# Patient Record
Sex: FEMALE | Race: WHITE
[De-identification: names, ages, dates, MRNs, and addresses within clinical notes are randomized per-mention and may not be internally consistent; named-entity substitution may affect disease eponyms.]

---

## 2017-02-08 NOTE — EDPHY
H & P


Stated Complaint: left pelvic pain today, reports left ovarian cyst





- Personal History


LMP (Females 10-55): Hysterectomy


Current Tetanus/Diphtheria Vaccine: Yes


Current Tetanus Diphtheria and Acellular Pertussis (TDAP): Yes


Tetanus Vaccine Date: 2010





- Medical/Surgical History


Hx Asthma: No


Hx Chronic Respiratory Disease: No


Hx Diabetes: No


Hx Cardiac Disease: No


Hx Renal Disease: No


Hx Cirrhosis: No


Hx Alcoholism: No


Hx HIV/AIDS: No


Hx Splenectomy or Spleen Trauma: No


Other PMH: left ovarian cyst, hysterectomy-prolapse.





- Social History


Smoking Status: Never smoked


Time Seen by Provider: 02/08/17 22:57


HPI/ROS: 


CHIEF COMPLAINT:  Left lower quadrant abdominal pain





HISTORY OF PRESENT ILLNESS:  41-year-old female history of partial hysterectomy

, multiple ovarian cyst, complaining of left lower quadrant abdominal pain/

cramping since earlier today.  Describes feeling similar to her prior ovarian 

cysts.  Non thunderclap pain.  No radiation.  No antecedent symptoms. No nausea 

or vomiting. No fever no chills. Bowel movements normal, no melena or 

hematochezia.  No constipation.  No diarrhea.  Normal urinary habits.








REVIEW OF SYSTEMS:


A ten point review of systems was performed and is negative with the exception 

of the items mentioned in the HPI








PAST MEDICAL & SURGICAL  HISTORY:  Partial hysterectomy.  Ovarian cyst





SOCIAL HISTORY:nonsmoker     











************


PHYSICAL EXAM





(Prior to examination, patient consented to physical exam, hands were washed 

and my usual and customary physical exam procedures followed)


1) GENERAL: Well-developed, well-nourished, alert and oriented.  Appears 

nontoxic .


2) HEAD: Normocephalic, atraumatic


3) HEENT: Pupils equal, round, reactive to light bilaterally.  Sclera 

anicteric.  


4) NECK: Full range of motion, no meningeal signs.


5) LUNGS: Clear auscultation bilaterally, no wheezes, no rhonchi, no 

retractions.   


6) HEART: Regular rate and rhythm, no murmur, no heave, no gallop.


7) ABDOMEN: No guarding, tender to palpation left lower quadrant, negative 

McBurney's, negative Mauro's,, negative peritoneal sign,


8) MUSCULOSKELETAL: Moving all extremities, no focal areas of tenderness, no 

obvious trauma.  No peripheral edema or discoloration.


9) BACK: No CVA tenderness, no midline vertebral tenderness, no fluctuance, no 

step-off, no obvious trauma, no visual or palpable abnormality. 


10) SKIN: No rash, no petechiae. 


11) Psychiatric:  Patient is oriented X 3, there is no agitation.








***************





DIFFERENTIAL DIAGNOSIS:   My differential diagnosis includes, but is not 

limited to, acute appendicitis, acute cholecystitis, bowel obstruction, 

Diverticulitis,acute pancreatitis, ovarian torsion, ectopic pregnancy, 

gastritis and urinary tract infection.  The patient understands that this 

diagnosis is provisional and can never be 100% accurate.  This is a partial 

list of diagnoses considered. These considerations are based on history, 

physical exam, past history and reassessment.


 (JESUS MANUEL Rodrigez)





Constitutional: 


 Initial Vital Signs











Temperature (C)  36.6 C   02/08/17 22:24


 


Heart Rate  95   02/08/17 22:24


 


Respiratory Rate  15   02/08/17 22:24


 


Blood Pressure  106/64   02/08/17 22:24


 


O2 Sat (%)  95   02/08/17 22:24








 











O2 Delivery Mode               Room Air

















Allergies/Adverse Reactions: 


 





No Known Allergies Allergy (Verified 11/15/16 15:23)


 








Home Medications: 














 Medication  Instructions  Recorded


 


Magnesium Citrate [Magnesium 300 ml PO ONCE #1 bottle 02/09/17





Citrate 300 ml (*)]  


 


Polyethylene Glycol 3350 [Miralax 17 gm PO DAILY #10 pkt 02/09/17





17 gm (*)]  














Medical Decision Making





- Diagnostics


Imaging: 


CT scan abdomen pelvis performed with IV contrast demonstrates is large amount 

of stool in colon extending to near the cecum.  This was discussed with Dr. Whitt of Radiology. (Reyna Venegas)





ED Course/Re-evaluation: 


1230 am: Care turned over to Dr Venegas at this time who will followup on 

patient's CT abdomen.  (JESUS MANUEL Rodrigez)


PHYSICIAN DOCUMENTATION:


The patient was evaluated and managed by the Physician Assistant.  My co-

signature indicates that I have reviewed this chart and I agree with the 

findings and plan of care as documented.  I am the secondary supervising 

physician.








1:00 a.m.-


I have assumed primary care of the patient.  CT scan has resulted showing 

constipation.  I have discussed this with the patient at the bedside.  I plan 

to start her on MiraLax and magnesium citrate.  She is to follow up with her 

regular doctor or Gastroenterology if her pain continues. (Reyna Venegas)








- Data Points


Laboratory Results: 


 Laboratory Results





 02/08/17 22:50 





 02/08/17 22:50 





 











  02/08/17 02/08/17





  23:58 22:50


 


WBC    6.87 10^3/uL





    (3.80-9.50) 


 


RBC    4.50 10^6/uL





    (4.18-5.33) 


 


Hgb    14.2 g/dL





    (12.6-16.3) 


 


Hct    42.0 %





    (38.0-47.0) 


 


MCV    93.3 fL





    (81.5-99.8) 


 


MCH    31.6 pg





    (27.9-34.1) 


 


MCHC    33.8 g/dL





    (32.4-36.7) 


 


RDW    12.5 %





    (11.5-15.2) 


 


Plt Count    312 10^3/uL





    (150-400) 


 


MPV    9.1 fL





    (8.7-11.7) 


 


Neut % (Auto)    51.7 %





    (39.3-74.2) 


 


Lymph % (Auto)    38.7 %





    (15.0-45.0) 


 


Mono % (Auto)    7.1 %





    (4.5-13.0) 


 


Eos % (Auto)    2.0 %





    (0.6-7.6) 


 


Baso % (Auto)    0.4 %





    (0.3-1.7) 


 


Nucleat RBC Rel Count    0.0 %





    (0.0-0.2) 


 


Absolute Neuts (auto)    3.54 10^3/uL





    (1.70-6.50) 


 


Absolute Lymphs (auto)    2.66 10^3/uL





    (1.00-3.00) 


 


Absolute Monos (auto)    0.49 10^3/uL





    (0.30-0.80) 


 


Absolute Eos (auto)    0.14 10^3/uL





    (0.03-0.40) 


 


Absolute Basos (auto)    0.03 10^3/uL





    (0.02-0.10) 


 


Absolute Nucleated RBC    0.00 10^3/uL





    (0-0.01) 


 


Immature Gran %    0.1 %





    (0.0-1.1) 


 


Immature Gran #    0.01 10^3/uL





    (0.00-0.10) 


 


Sodium    138 mEq/L





    (134-144) 


 


Potassium    3.8 mEq/L





    (3.5-5.2) 


 


Chloride    104 mEq/L





    () 


 


Carbon Dioxide    24 mEq/l





    (22-31) 


 


Anion Gap    10 mEq/L





    (8-16) 


 


BUN    8 mg/dL





    (7-23) 


 


Creatinine    0.7 mg/dL





    (0.6-1.0) 


 


Estimated GFR    > 60 





   


 


Glucose    99 mg/dL





    () 


 


Calcium    9.6 mg/dL





    (8.5-10.4) 


 


Total Bilirubin    0.7 mg/dL





    (0.1-1.4) 


 


Conjugated Bilirubin    0.4 mg/dL





    (0.0-0.5) 


 


Unconjugated Bilirubin    0.3 mg/dL





    (0.0-1.1) 


 


AST    28 IU/L





    (14-46) 


 


ALT    27 IU/L





    (9-52) 


 


Alkaline Phosphatase    47 IU/L





    () 


 


Total Protein    8.7 H g/dL





    (6.3-8.2) 


 


Albumin    4.9 g/dL





    (3.5-5.0) 


 


Lipase    99.0 IU/L





    () 


 


Beta HCG, Qual    NEGATIVE 





   


 


Urine Color  YELLOW   





   


 


Urine Appearance  CLEAR   





   


 


Urine pH  6.0   





   (5.0-7.5)  


 


Ur Specific Gravity  1.010   





   (1.002-1.030)  


 


Urine Protein  NEGATIVE   





   (NEGATIVE)  


 


Urine Ketones  NEGATIVE   





   (NEGATIVE)  


 


Urine Blood  NEGATIVE   





   (NEGATIVE)  


 


Urine Nitrate  NEGATIVE   





   (NEGATIVE)  


 


Urine Bilirubin  NEGATIVE   





   (NEGATIVE)  


 


Urine Urobilinogen  NEGATIVE EU  





   (0.2-1.0)  


 


Ur Leukocyte Esterase  NEGATIVE   





   (NEGATIVE)  


 


Urine RBC  1-3 /hpf  





   (0-3)  


 


Urine WBC  1-3 /hpf  





   (0-3)  


 


Ur Epithelial Cells  TRACE /lpf  





   (NONE-1+)  


 


Urine Bacteria  TRACE H /hpf  





   (NONE SEEN)  


 


Urine Mucus  TRACE /lpf  





   (NONE-1+)  


 


Urine Glucose  NEGATIVE   





   (NEGATIVE)  














Medications Given: 


 








Discontinued Medications





Hydromorphone HCl (Dilaudid)  1 mg IVP EDNOW ONE


   Stop: 02/08/17 23:22


   Last Admin: 02/08/17 23:43 Dose:  1 mg


Sodium Chloride (Ns)  1,000 mls @ 0 mls/hr IV EDNOW ONE


   PRN Reason: Wide Open


   Stop: 02/08/17 23:43


   Last Admin: 02/08/17 23:43 Dose:  1,000 mls


Ondansetron HCl (Zofran)  4 mg IVP EDNOW ONE


   Stop: 02/08/17 23:22


   Last Admin: 02/08/17 23:44 Dose:  4 mg


Ondansetron HCl (Zofran)  4 mg IVP EDNOW ONE


   Stop: 02/09/17 00:39


   Last Admin: 02/09/17 00:47 Dose:  4 mg











Departure





- Departure


Disposition: Home, Routine, Self-Care


Clinical Impression: 


 Abdominal pain, Constipation


Condition: Good


Instructions:  Acute Abdominal Pain (ED), High Fiber Diet (ED), Constipation (ED

)


Additional Instructions: 


Please make sure to drink plenty of fluids.  You should follow up with your 

doctor if you are not better in the next few days.


Referrals: 


Russell Christensen MD [Medical Doctor] - 2-3 days, call for appt.


Prescriptions: 


Magnesium Citrate [Magnesium Citrate 300 ml (*)] 300 ml PO ONCE #1 bottle


Polyethylene Glycol 3350 [Miralax 17 gm (*)] 17 gm PO DAILY #10 pkt

## 2017-02-08 NOTE — US
Pelvic sonogram, complete.



History: Pelvic pain.



Technique: Transabdominal and endovaginal. Pulsed and color flow Doppler investigation of the ovaries
 was performed.



Findings:



Transabdominal: Pelvic structures are poorly visualized.



Endovaginal examination: There are benign-appearing follicle cysts present within both ovaries. Paten
t arterial blood flow is documented to both ovaries on Doppler investigation. The uterus is surgicall
y absent. No peritoneal free fluid.



Impression:

1. Benign pelvic ultrasound examination. Small bilateral follicle cyst. Hysterectomy.



Results called to Maury Rodrigez PA-C at 11:50 PM.

## 2017-02-09 NOTE — CT
Contrast-Enhanced CT Scan of the Abdomen and Pelvis



Clinical History:  41-year-old female presenting to the ED complaining of left 
lower quadrant abdominal pain; rule out diverticulitis.



Technique:  Neither oral nor retrograde rectal contrast was administered.  The 
patient received 85 mL IV Isovue-300 without complication, and a multidetector 
helical CT scan was obtained from the lung bases inferiorly through the 
proximal femora, with images reformatted at 5.00 and 1.50 mm increments, and 
reviewed at a variety of window and level settings.  Parasagittal and 
paracoronal reconstructed images were reviewed on the workstation.  The DFOV is 
38.0 cm.  A dose reduction protocol was used.



Comparison Studies:  Pelvic sonography dated February 8, 2017, at 11:15 p.m., 
and CT imaging of the abdomen and pelvis, dated November 15, 2016.



Findings:



Contrast-Enhanced CT Scan of the Abdomen:  The lung bases are clear.  The 
visualized cardiac chambers and pericardium are unremarkable. There is no 
pleural effusion.  The liver has an elongate right hepatic lobe measuring 18.5 
cm, which could represent mild hepatomegaly versus a Reidel variant.  There is 
no bile duct dilatation.  The gallbladder is moderately distended. The 
pancreatic contour is normal.  The spleen, adrenal glands, and the kidneys are 
normal. There is no ascites or pneumoperitoneum.  There is gastric distention 
with intraluminal fluid. There is no abnormal small bowel dilatation. There is 
profound constipation with fecal material noted from the level of the cecum to 
the rectosigmoid. There is no inflammatory diverticulitis or ascites.  The 
abdominal aorta and the IVC are normal in caliber.  The osseous structures are 
age-appropriate. 



Contrast-Enhanced CT Scan of the Pelvis:  The uterus is surgically absent.  The 
urinary bladder is not distended.  There is no adnexal mass, and pelvic 
sonography demonstrated normal-appearing ovaries. There is pronounced 
constipation.  There is no evidence of diverticulitis or pericolonic abscess, 
free fluid, or adenopathy.  The osseous structures are age-appropriate. The 
previous study on November 15, 2016 demonstrated some questionable wall 
thickening of the distal ileum, which is not seen today. The appendix is 
identified on series 4, images 195-204, and is normal. The presacral space is 
normal.



Preliminary results were conveyed to Dr. Reyna Venegas at 12:55 a.m. on 
February 9, 2017.  My final interpretation is concordant with my initial 
impression.



Impression:



1.  Pronounced constipation.

2.  There is no evidence of diverticulitis.

3.  Status post hysterectomy.

4.  Normal appearance of the appendix. 

5.  Hepatomegaly vs Reidel right hepatic lobe. 



POS99
MTDD

## 2017-02-22 NOTE — EDPHY
H & P


Stated Complaint: Abd bloating,constipation,abd pain;here 2/8 with same c/o;CT 

done then


Time Seen by Provider: 02/22/17 13:17


HPI/ROS: 





CHIEF COMPLAINT: Abdominal pain and distention. 





HISTORY OF PRESENT ILLNESS: The patient is a 41 year old female, sent here by 

her PCP for abdominal distention. The patient had a hysterectomy 1 year ago. 

Since the surgery she has continuously had trouble with constipation. Her 

constipation has worsened and she now feels more distended specifically on the 

left side of her abdomen. She was seen at [facility] and was advised to 

perform a bowel purge. Her symptoms did not improve after the purge and x-ray 

showed constipation. Patient was instructed not to use stimulant laxative due 

to dilated bowel.  She saw her PCP today who recommended the patient come to 

the ED due to worsening abdominal discomfort and unimproved abdominal distention

, She denies dysuria or hematuria. 


No fevers, vomiting, diarrhea.





REVIEW OF SYSTEMS:


Aside from elements discussed in the HPI, a comprehensive 10-point review of 

systems was reviewed and is negative.





PAST MEDICAL HISTORY: Hysterectomy, Chronic constipation, Laxative dependent. 





SOCIAL HISTORY:





VITAL SIGNS:  Reviewed by me


GENERAL:  Well-developed, well-nourished, resting comfortably in no respiratory 

distress.


HEENT:  Atraumatic.  Eyes:  No icterus, no injection.  Mouth:  moist mucous 

membranes.  No erythema or lesions.  Neck:  supple with no adenopathy.


LUNGS:  Clear to auscultation bilaterally, no wheezes, rhonchi or rales.


CARDIAC:  Regular rate and rhythm, no rubs, murmurs or gallops.


ABDOMEN:  Soft, visibily distended on the left greater than right.  Moderate 

left sided tenderness; no rebound or guarding.


BACK:  No CVA tenderness.


EXTREMITIES:  No trauma.  No edema.  Range of motion is normal throughout.


NEURO:  Alert and oriented,  grossly nonfocal.


SKIN:  Warm and dry, no rash.


PSYCHIATRIC:  Normal mentation, no agitation.





Portions of this note were transcribed by a medical scribe.  I personally 

performed a history, physical exam, medical decision making, and confirmed 

accuracy of information the transcribed note.





- Personal History


LMP (Females 10-55): Hysterectomy


Current Tetanus Diphtheria and Acellular Pertussis (TDAP): Yes


Tetanus Vaccine Date: 2010





- Medical/Surgical History


Hx Asthma: No


Hx Chronic Respiratory Disease: No


Hx Diabetes: No


Hx Cardiac Disease: No


Hx Renal Disease: No


Hx Cirrhosis: No


Hx Alcoholism: No


Hx HIV/AIDS: No


Hx Splenectomy or Spleen Trauma: No


Other PMH: left ovarian cyst, hysterectomy-prolapse.  laxative dependent, 

chronic constipation





- Social History


Smoking Status: Never smoked


Constitutional: 


 Initial Vital Signs











Temperature (C)  36.7 C   02/22/17 13:14


 


Heart Rate  93   02/22/17 13:14


 


Respiratory Rate  18   02/22/17 13:14


 


Blood Pressure  159/108 H  02/22/17 13:14


 


O2 Sat (%)  99   02/22/17 13:14








 











O2 Delivery Mode               Room Air














Allergies/Adverse Reactions: 


 





No Known Allergies Allergy (Verified 02/22/17 13:13)


 








Home Medications: 














 Medication  Instructions  Recorded


 


Docusate Sodium [Colace 100 MG (*)] 100 mg PO BID 02/22/17


 


Peg 3350/Na Sulf,Bicarb,Cl/KCl 4,000 ml PO AD #1 btl 02/22/17





[Golytely (RX)]  


 


Polyethylene Glycol 3350 [Miralax  02/22/17





17 gm (*)]  














Medical Decision Making





- Diagnostics


Imaging: 





X-ray:  Two-view abdomen was obtained.  I viewed the images myself on the PACS 

system.  My interpretation of the images is:  Dilated large bowel full of 

stool.  No air-fluid levels.  The radiologist interpretation is pending at this 

time.  I discussed the x-ray findings with the patient.


ED Course/Re-evaluation: 





The patient has chronic constipation and relies on laxatives. An abdominal x-

ray was ordered. I will consult her gastroenterologist. 





Patient's x-ray continues to show significant constipation despite the use of 

magnesium citrate and bowel purges.


Discussed patient at length with the PA from [facility], Summer Monet.  

She is concerned regarding potential neurologic causes of constipation.


Patient to perform a bowel purge with GoLYTELY and will obtain a prescription 

from [facility] the [facility].


Reinforced the need for patient to keep appointment for ano-rectal manometry 

testing and to FU with GI.


Appointment made by . 





Differential Diagnosis: 





After obtaining the patient's history and performing an examination, 

differential diagnosis considered included but was not limited to constipation, 

bowel obstruction, toxic megacolon, functional ileus, laxative abuse, 

neurologic causes.





Departure





- Departure


Disposition: Home, Routine, Self-Care


Clinical Impression: 


 Abdominal pain, left lower quadrant, Chronic constipation





Condition: Good


Instructions:  Constipation (ED), High Fiber Diet (ED)


Additional Instructions: 


Do not take further stimulant laxatives.





Please take the GoLYTELY as directed.


Please discuss the appropriate dosing of GoLYTELY with GI of the [facility].





A sample medications will be waiting for you at  [facility].  Please 

pick this up today.





As mentioned previously, follow up with your surgeon as well as follow-up for 

anorectal manometry will be important.





We have made you an appointment with Asia Monet next Wednesday, Febraury 28, 

at 330 in the afternoon at their [place] location. The address is [address] [place] [address]





Referrals: 


NONE *PRIMARY CARE P,. [Primary Care Provider] - As per Instructions


Summer Monet, PAC [Physician Assistant] - As per Instructions


Prescriptions: 


Peg 3350/Na Sulf,Bicarb,Cl/KCl [Golytely (RX)] 4,000 ml PO AD #1 btl


Report Scribed for: Laly Blankenship


Report Scribed by: Alexandra Gundersen


Date of Report: 02/22/17


Time of Report: 14:09

## 2017-04-03 NOTE — EDPHY
General





- History


Smoking Status: Never smoked


Narrative: 





CHIEF COMPLAINT:  Abdominal pain, distention





HISTORY OF PRESENT ILLNESS:  Patient has had 4-6 months of abdominal pain and 

distention. This was gradual onset.  It is constant in duration but waxes and 

wanes from mild to severe.  It is currently rated as severe distension.  Worse 

after she eats.  Sometimes improves when NPO, at other times does not.  

Associated with nausea but no vomiting. No fevers or chills. No bloody stools 

or emesis.  No diarrhea.  It is associated with intermittent constipation.  She 

is status post hysterectomy in 2015.  She has been worked up by Internal 

Medicine, GI and Gynecology.  Reportedly had ovarian cysts that were to be due 

to this, but after those resolved her symptoms persist.  Gastroenterologist 

feel that there might be a colonic transit abnormality, but she has only been 

on stool softeners and glycerin suppositories.  No stimulant laxatives..  No 

trauma or injury. No other associated complaints or modifying factors.








REVIEW OF SYSTEMS:


Ten systems reviewed and are negative unless otherwise noted in the HPI





EXAMINATION:


General Appearance:  Alert, no distress, crying but consolable


Head: normocephalic, atraumatic


Eyes:  Pupils equal and round, no conjunctival pallor or injection.  EOMs 

intact.


ENT, Mouth:  Mucous membranes moist.  Uvula midline. No erythema edema.


Neck:  Normal inspection, supple, non-tender


Respiratory:  Lungs are clear to auscultation.  No wheezing, rhonchi or 

crackles.


Cardiovascular:  Regular rate and rhythm.  No murmur.  Pulses intact distally.


Gastrointestinal:  Abdomen is soft and moderately tender in all quadrants.  No 

point tenderness.  There is moderate tympany in all quadrants.  Decreased bowel 

sounds in all quadrants.  No guarding.  No rigidity.  No CVA tenderness.


Neurological:  A&O, nonfocal, normal gait


Skin:  Warm and dry, no rash


Extremities:  Nontender, no pedal edema


Psychiatric:  Mood and affect normal





DIFFERENTIAL DIAGNOSES:


Including but not limited to small-bowel obstruction, large bowel obstruction, 

volvulus, intussusception, constipation, obstipation, enteritis, gastritis





MDM:


9:39 p.m.


Moderate abdominal pain and distention.  The patient is tympanic on 

examination. She is in obvious discomfort.  Vital signs are stable.  She is not 

actively vomiting.  I have ordered CT scan of the abdomen and pelvis and 

abdominal laboratory studies.  She is in no acute distress.





11:30 p.m.


Abdominal pain with significant gastric distention on CT scan as read by  The 

radiologist.  No mechanical bowel obstruction.  Distension is significant 

enough to displace the transverse colon into the pelvis.  She is not actively 

vomiting.  She is not asking for repetitive narcotics.  Patient does have 

significant pain by admission and tympany on examination. She is nondiabetic 

with no diagnosis of gastric outlet obstruction.  I have discussed the case 

with the hospitalist for admission Dr. Bermudez has accepted the patient.  The 

are in agreement that NG to would be helpful for decompression of the gastric 

distention.  The patient is agreeable with this plan as well.  She has been 

admitted in stable condition.





ED Precautions:


Worsening pain. Fever.  Bloody stools.  Bloody emesis.  Constipation or 

diarrhea.





SUPERVISION:


This patient was independently evaluated without direct examination by the 

attending physician. Case was discussed with attending physician.


Patient admitted to the hospitalist at 11:37 p.m.  (Saul Blake)


Medical Decision Making: 





PHYSICIAN DOCUMENTATION:


The patient was evaluated and managed by the Physician Assistant.  My co-

signature indicates that I have reviewed this chart and I agree with the 

findings and plan of care as documented.  I am the secondary supervising 

physician.


 (Reyna Venegas)





- Objective


Vital Signs: 





 Initial Vital Signs











Temperature (C)  36.4 C   04/03/17 21:06


 


Heart Rate  81   04/03/17 21:06


 


Respiratory Rate  18   04/03/17 21:06


 


Blood Pressure  106/76   04/03/17 21:06


 


O2 Sat (%)  97   04/03/17 21:06








 











O2 Delivery Mode               Room Air














Allergies/Adverse Reactions: 


 





No Known Allergies Allergy (Verified 02/22/17 13:13)


 








Home Medications: 














 Medication  Instructions  Recorded


 


Docusate Sodium [Colace 100 MG (*)] 100 mg PO BID 02/22/17


 


Peg 3350/Na Sulf,Bicarb,Cl/KCl 4,000 ml PO AD #1 btl 02/22/17





[Golytely (RX)]  


 


Polyethylene Glycol 3350 [Miralax  02/22/17





17 gm (*)]  











Laboratory Results: 





 Laboratory Results





 04/03/17 21:35 





 04/03/17 21:35 





 











  04/03/17 04/03/17 04/03/17





  21:35 21:35 21:35


 


WBC      5.62 10^3/uL 10^3/uL





     (3.80-9.50) 


 


RBC      4.54 10^6/uL 10^6/uL





     (4.18-5.33) 


 


Hgb      14.0 g/dL g/dL





     (12.6-16.3) 


 


Hct      42.0 % %





     (38.0-47.0) 


 


MCV      92.5 fL fL





     (81.5-99.8) 


 


MCH      30.8 pg pg





     (27.9-34.1) 


 


MCHC      33.3 g/dL g/dL





     (32.4-36.7) 


 


RDW      12.9 % %





     (11.5-15.2) 


 


Plt Count      318 10^3/uL 10^3/uL





     (150-400) 


 


MPV      8.9 fL fL





     (8.7-11.7) 


 


Neut % (Auto)      49.0 % %





     (39.3-74.2) 


 


Lymph % (Auto)      38.3 % %





     (15.0-45.0) 


 


Mono % (Auto)      8.2 % %





     (4.5-13.0) 


 


Eos % (Auto)      3.6 % %





     (0.6-7.6) 


 


Baso % (Auto)      0.7 % %





     (0.3-1.7) 


 


Nucleat RBC Rel Count      0.0 % %





     (0.0-0.2) 


 


Absolute Neuts (auto)      2.76 10^3/uL 10^3/uL





     (1.70-6.50) 


 


Absolute Lymphs (auto)      2.15 10^3/uL 10^3/uL





     (1.00-3.00) 


 


Absolute Monos (auto)      0.46 10^3/uL 10^3/uL





     (0.30-0.80) 


 


Absolute Eos (auto)      0.20 10^3/uL 10^3/uL





     (0.03-0.40) 


 


Absolute Basos (auto)      0.04 10^3/uL 10^3/uL





     (0.02-0.10) 


 


Absolute Nucleated RBC      0.00 10^3/uL 10^3/uL





     (0-0.01) 


 


Immature Gran %      0.2 % %





     (0.0-1.1) 


 


Immature Gran #      0.01 10^3/uL 10^3/uL





     (0.00-0.10) 


 


PT  13.3 SEC SEC    





   (12.0-15.0)   


 


INR  1.02     





   (0.83-1.16)   


 


APTT  27.9 SEC SEC    





   (23.0-38.0)   


 


Sodium    139 mEq/L mEq/L  





    (134-144)  


 


Potassium    3.6 mEq/L mEq/L  





    (3.5-5.2)  


 


Chloride    103 mEq/L mEq/L  





    ()  


 


Carbon Dioxide    23 mEq/l mEq/l  





    (22-31)  


 


Anion Gap    13 mEq/L mEq/L  





    (8-16)  


 


BUN    9 mg/dL mg/dL  





    (7-23)  


 


Creatinine    0.9 mg/dL mg/dL  





    (0.6-1.0)  


 


Estimated GFR    > 60   





    


 


Glucose    76 mg/dL mg/dL  





    ()  


 


Calcium    9.4 mg/dL mg/dL  





    (8.5-10.4)  


 


Total Bilirubin    0.8 mg/dL mg/dL  





    (0.1-1.4)  


 


Conjugated Bilirubin    0.3 mg/dL mg/dL  





    (0.0-0.5)  


 


Unconjugated Bilirubin    0.5 mg/dL mg/dL  





    (0.0-1.1)  


 


AST    18 IU/L IU/L  





    (14-46)  


 


ALT    26 IU/L IU/L  





    (9-52)  


 


Alkaline Phosphatase    42 IU/L IU/L  





    ()  


 


Total Protein    8.0 g/dL g/dL  





    (6.3-8.2)  


 


Albumin    4.5 g/dL g/dL  





    (3.5-5.0)  


 


Lipase    116.0 IU/L IU/L  





    ()  











Medications Given: 





 








Discontinued Medications





Fentanyl (Sublimaze)  50 mcg IVP EDNOW ONE


   Stop: 04/04/17 00:05


   Last Admin: 04/04/17 00:11 Dose:  50 mcg


Hydromorphone HCl (Dilaudid)  0.5 mg IVP EDNOW ONE


   Stop: 04/03/17 21:39


   Last Admin: 04/03/17 21:43 Dose:  0.5 mg


Sodium Chloride (Ns)  1,000 mls @ 0 mls/hr IV ONCE ONE


   PRN Reason: Wide Open


   Stop: 04/03/17 21:39


   Last Admin: 04/03/17 21:40 Dose:  1,000 mls


Ondansetron HCl (Zofran)  4 mg IVP EDNOW ONE


   Stop: 04/03/17 21:39


   Last Admin: 04/03/17 21:40 Dose:  4 mg








Departure





- Departure


Disposition: Home, Routine, Self-Care


Clinical Impression: 


Abdominal pain


Qualifiers:


 Abdominal location: generalized Qualified Code(s): R10.84 - Generalized 

abdominal pain





Condition: Good

## 2017-04-04 NOTE — GHP
[f rep st]



                                                            HISTORY AND PHYSICAL





DATE OF ADMISSION:  04/04/2017



HISTORY OF PRESENT ILLNESS:  The patient is a pleasant 42-year-old female with a past medical histor
y of hysterectomy in 2015.  Since, she has had increasing abdominal pain and distention, it has real
ly got worse about 4-6 months ago.  She notes that she has increased abdominal distention.  She has 
abdominal pain, rarely vomiting, rare nausea, just mostly distention.  She has bowel movements every
 day.  Occasionally hard, but mostly firm.  She does not take oral narcotics or any other narcotics.
  She had a history of Henoch-Schonlein purpura, as a child, that came with abdominal pain. 



She has seen a gastroenterologist for this and she had a colonoscopy showing a redundant colon, but 
otherwise unremarkable.  Additionally, she has had pelvic MRI and anorectal manometry.  Those result
s are not clear at this time.  She has been taking glycerin suppositories and GoLYTELY and she has b
owel movements most days.  She has in the past taken senna, but has never abused stimulant laxatives
, or had an eating disorder.  She denies a history of recent increased stressors other than related 
to this, but we discussed the role of mood with GI tract.  She has not clearly had IBS like symptoms
.  She does not believe she has depression, or does not sound like it from what she is saying. 



No fever, chills.  No cough.  No sputum.  She expresses frustration that seems to be negatively impa
cting her life.  She is able to the eat.



REVIEW OF SYSTEMS:  Complete 10-point review of systems conducted.  Negative, except as noted in the
 HPI.



PAST MEDICAL HISTORY:  Remote HSP and then hysterectomy for uterine prolapse.  She has had 4 childre
n.



ALLERGIES:  No known drug allergies.



HOME MEDICATIONS:  Linzess, glycerin suppositories, and MiraLAX.



SOCIAL HISTORY:  She is .  She drinks rare alcohol.  Does not smoke cigarettes.  No narcotics
.



FAMILY HISTORY:  Unremarkable.



PHYSICAL EXAM:  VITAL SIGNS:  Temp 36.4, blood pressure 106/76, pulse 81, breathing 18 times a minut
e, 97% on room air.  GENERAL:  No acute distress.  HEENT:  Sclerae anicteric.  Oropharynx clear.  Mu
cous membranes are moist.  NECK:  Supple without lymphadenopathy or JVD.  LUNGS:  Clear to auscultat
ion bilaterally.  HEART:  S1, S2.  Not tachycardic.  ABDOMEN:  Soft, it is distended.  There are nor
moactive bowel sounds.  It is markedly distended.  EXTREMITIES:  Her lower extremities are without e
fredrick.  Calves are nontender.  SKIN:  Without rash.  NEUROLOGIC:  Nonfocal.



LABS:  White count 5.6, hematocrit 42, platelets are 318,000.  Coags normal.  Sodium 139, potassium 
3.6, chloride 103, bicarb 23, BUN 9, creatinine 0.9.  LFTs normal.  Lipase normal.  CT scan of the [place] [place], images reviewed/interpreted by me, shows marked gastric distention.  Her GI tract is full of
 stool.  There was some evidence of fecalization of the small bowel contents.



ADDITIONAL HISTORY:  The patient took a course of doxycycline for concern for small bowel overgrowth
 syndrome.  I have discussed the case with Saul Blake.



ASSESSMENT AND PLAN:  A 42-year-old female with increasing abdominal distention without clear obstru
ction.

1.  Abdominal distention.  This appears to be a motility issue.  She does not have diabetes.  We denae
l check a hemoglobin A1c in the morning.  She is not hyperglycemic and a nonfasting sample here.  I 
think it is more than just her stomach, so a gastric emptying study will be less helpful.  At this p
oint in time, I will start her on Reglan.  I will have Gastroenterology see her.  She notes she dran
k a colonoscopy prep, which made her abdomen better, but then it quickly returned.  I did not take a
 dietary history.

2.  History of Henoch-Schonlein purpura.  It is unclear how this is playing a role in this current p
resentation, likely not.  I have ordered a sedimentation rate for the morning.

3.  Young women with abdominal pain.  She is status post hysterectomy.

4.  Gastric distention. She had a trial of NG tube.  Some fluid came out, but then ultimately did no
t help so it is removed because she was uncomfortable.

5.  Prophylaxis.  Pharmacologic prophylaxis indicated.

6.  Disposition.  Inpatient status.  I anticipate more than 2 midnights required for the management 
of this problem.





Job #:  [id number]

## 2017-04-04 NOTE — HOSPPROG
Hospitalist Progress Note


Assessment/Plan: 





* abdominal pain/ distention/ constipation


*  appears that her symptoms began after hysterectomy in 2015 with 

constipation.  This has progressed to abdominal pain after eating.  I suspect 

she may have had some type of nerve damage from her hysterectomy causing 

chronic constipation and now progressing to small bowel overgrowth syndrome.  

The [facility] had [facility] seems to [facility] although we do not have the 

records currently.  Would like to find ways to increase bowel motility.  She 

probably needs to be on some type of stimulant rather than just stool 

softeners.  Would probably be on some dose of magnesium.  Could also consider 

acupuncture.  Her possible small bowel overgrowth syndrome should also be 

treated and will consider discharging on rifaximin.  I have discussed with GI 

who will come see her.


Subjective: Feels a little bit better.  Feels a little bit better had a bowel 

movement yesterday


Objective: 


 Vital Signs











Temp Pulse Resp BP Pulse Ox


 


 36.5 C   68   16   90/58 L  98 


 


 04/04/17 09:31  04/04/17 09:31  04/04/17 09:31  04/04/17 09:31  04/04/17 09:31








 Laboratory Results





 04/04/17 04:59 





 04/04/17 04:59 





 











 04/03/17 04/04/17 04/05/17





 05:59 05:59 05:59


 


Intake Total  1315 


 


Output Total  100 


 


Balance  1215 








 











PT  13.3 SEC (12.0-15.0)   04/03/17  21:35    


 


INR  1.02  (0.83-1.16)   04/03/17  21:35    








 discussed with gastroenterology.  CT scan personally reviewed interpreted





- Time Spent With Patient


Time Spent with Patient: greater than 35 minutes (Discussing patient's condition

)


Time Spent with Patient: Greater than 35 minutes spent on this patients care, 

greater than 50% of time spent counseling, educating, and coordinating care 

regarding the above mentioned plan.





- Physical Exam


Constitutional: no apparent distress, appears nourished, not in pain


Eyes: anicteric sclera, EOMI


Ears, Nose, Mouth, Throat: moist mucous membranes, hearing normal, ears appear 

normal, no oral mucosal ulcers


Cardiovascular: regular rate and rhythym, no murmur, rub, or gallop


Respiratory: no respiratory distress, no rales or rhonchi, clear to auscultation


Gastrointestinal: other ( positive bowel sounds distended tympanic nontender)


Skin: warm


Neurologic: AAOx3


Psychiatric: interacting appropriately, not anxious, not encephalopathic, 

thought process linear





ICD10 Worksheet


Patient Problems: 


 Problems











Problem Status Onset


 


Abdominal pain Acute

## 2017-04-04 NOTE — GCON
[f 
rep st]



                                                                    CONSULTATION





ASSESSMENT:  A 42-year-old female with a complicated gastrointestinal history 
who presents with bloating, abdominal pain, and worsening constipation.  She 
has a history of an abdominal hysterectomy for uterine prolapse in 2015.  She 
has had some intermittent abdominal discomfort and constipation since her 
surgery, but especially over the last several months her symptoms have gotten 
increasingly bad, including bloating, abdominal pain with eating, and worsening 
constipation.  She had an extensive workup, including with our office and with 
the [facility].  I think she has a component of pelvic floor 
dyssynergia and constipation contributing to her symptoms.  The question is 
whether she has a secondary process such as small bowel bacterial overgrowth, 
irritable bowel syndrome, or a motility disorder contributing to her symptoms.  
For pelvic floor dysfunction, the initial treatment is typically biofeedback.  
She is already on a fairly aggressive bowel regimen and has found that Linzess 
is not helping.  Therefore, I am recommending a trial of Amitiza.  I am 
recommending rule out of celiac disease with celiac serologies.  We discussed 
other options, including dietary modifications with FODMAPs, which she has 
tried in the past, and pursuing additional workup such as upper endoscopy and/
or upper GI series with small bowel follow-through.  She is currently 
undergoing a trial of Reglan for poor motility.  I offered her a trial of 
Levsin or Bentyl for bloating.  However, I did advise the side effect of this 
is constipation.



RECOMMENDATIONS:  

1.  Check celiac serologies.  These include celiac disease, including tissue 
transglutaminase and IgA level.

2.  Continue with current bowel regimen with the addition of Amitiza 8 mcg 
orally twice to start with possible titration to 24 mcg orally twice daily.

3.  Consider further workup, including upper endoscopy and/or small bowel follow
-through/upper GI series.

4.  Consider breath testing for small bowel bacterial overgrowth.  This will 
have to be done at [facility].

5.  We discussed a trial of Levsin or Bentyl as an antispasmodic/anti-bloating 
agent, although she understands that this can exacerbate constipation.

6.  She is currently undergoing a trial of Reglan, and we will monitor for 
response.  I did advise that this can result in parkinsonian-like symptoms and 
involuntary movements with an increased risk in duration of usage greater than 
3 months. 



We will follow along.  Thank you for allowing me to participate in the care of 
your patient.  Please do not hesitate to call with questions.



CHIEF COMPLAINT:  I was asked to see the patient in consultation by Dr. Marquis 
for a chief complaint of abdominal bloating, constipation, and pelvic floor 
dysfunction.



HISTORY OF PRESENT ILLNESS:  The patient is a 42-year-old female with a history 
of uterine polyps and hysterectomy in 2015.  She has increasing abdominal pain 
over the last few months, specifically with bloating and constipation.  She is 
on an aggressive bowel movement but continues to still have infrequent bowel 
movements.  She previously was on pain medications but not currently.  She has 
not used marijuana.  She has undergone an extensive workup, including anorectal 
manometry and colonoscopy last week.  The anorectal manometry showed pelvic 
floor dyssynergia/pelvic floor dysfunction.  Biofeedback was recommended.  
Nothing makes her symptoms better.  Food makes her bloating worse.  Upon 
initial presentation, she received a nasogastric tube with suction, which 
improved her symptoms.  She denies any fevers.



REVIEW OF SYSTEMS:  CONSTITUTIONAL:  Negative.  HEENT:  Negative.  RESPIRATORY:
  Negative.  CARDIOVASCULAR:  Negative.  GASTROINTESTINAL:  See History of 
Present Illness for details.  GENITOURINARY:  Negative.  For REPRODUCTIVE/
ENDOCRINE:  Negative.  MUSCULOSKELETAL:  Negative.  HEMATOLOGIC/LYMPHATIC:  
Negative.  IMMUNOLOGIC/ALLERGIC/RHEUMATOLOGIC:  Negative.  SKIN:  Negative.  
NEUROLOGIC:  Negative.  MENTAL HEALTH:  Negative.



PAST MEDICAL HISTORY:  

1.  Remote history of Henoch-Schonlein purpura.

2.  History of hysterectomy for uterine prolapse.

3.  History of constipation.



PAST SURGICAL HISTORY:  History of hysterectomy for uterine prolapse.



FAMILY HISTORY:  No family history noted related to the chief complaint.



SOCIAL HISTORY:  She is .  She drinks rare alcohol.  She does not smoke 
cigarettes.



ALLERGIES:  She has no known drug allergies.



CURRENT MEDICATIONS:  

1.  Tylenol.

2.  Docusate.

3.  Dilaudid as needed for severe pain.

4.  Reglan 10 mg IV every 6 hours.

5.  Zofran 4 mg IV every 4 hours.  

6.  Polyethylene glycol 17 g orally twice per day.  

7.  Enoxaparin 40 mg subcu daily.



PHYSICAL EXAMINATION:  VITAL SIGNS:  Blood pressure 90/50, heart rate 68, 
respirations 16, oxygen saturation 98% on room air.  GENERAL:  She is awake, 
alert, and interactive, in no acute distress.  HEENT:  TYESHA.  Oral mucous is 
intact.  CHEST:  Clear to auscultation bilaterally without rales, rhonchi, or 
wheezing.  CARDIOVASCULAR:  Regular rate and rhythm.  No murmurs, rubs, or 
gallops.  Normal S1, S2.  ABDOMEN:  Distended diffusely but soft.  Positive 
bowel sounds are heard.  MUSCULOSKELETAL:  There is full range of motion.  SKIN
:  Pink, warm, and well perfused.  No rashes.  NEUROLOGIC:  Grossly nonfocal.  
Cranial nerves 2-12 are intact.  Coordinated gait.  LYMPH NODES:  No palpable 
lymph nodes.  PSYCHIATRIC:  Oriented x3.  No mood disorder.  Normal affect.



LABORATORY DATA:  White blood cell count is 6.45, hemoglobin 12.3, hemoglobin 37
, MCV 59, platelets 260.  Sodium 137, potassium 4.3, chloride 109, CO2 22, 
anion gap 6, BUN 11, creatinine 0.7.  TSH 5.8.  INR is 1.



IMAGING: Abdominal CT scan unremarkable for etiology of her symptoms. No bowel 
obstruction. Dilated/ distended stomach with colonic constipation. 





Job #:  [id number]

MTDD

## 2017-04-05 NOTE — GDS
[f rep st]



                                                             DISCHARGE SUMMARY





DISCHARGE DIAGNOSES:  

1.  Abdominal pain of uncertain etiology, probable variant of irritable bowel syndrome.  Possibly sm
all intestine bacterial overgrowth syndrome.

2.  Chronic constipation after hysterectomy done 2 years ago.



HISTORY:  This is a 42-year-old female who did not have any gastrointestinal problems until she had 
a hysterectomy 2 years ago.  She then developed constipation, and then over the last year she has be
en having abdominal distention, pain and gas after eating.  She presented with exacerbation of these
 symptoms.



HOSPITAL COURSE:  The patient was admitted and started on Reglan.  However, that did give her headac
hes and she did not want to continue that. 



Gastroenterology was consulted, who thought that she may consider Amitiza outpatient.  She is follow
ing up with a gastroenterologist at [facility] and has had several tests that suggest that she may h
ave problems with emptying her rectum.  I am wondering if she might have sustained a little bit of n
erve damage from her hysterectomy causing chronic constipation, and now may have small intestine cassius
terial overgrowth syndrome.  She is feeling somewhat better and she wants to be discharged. 



She will follow up with her gastroenterologist next week.



TIME SPENT:  Greater than 30 minutes was spent on discharge.





Job #:  [id number]

## 2017-06-17 NOTE — EDPHY
H & P


Stated Complaint: Abdominal pain


Time Seen by Provider: 06/17/17 21:57


HPI/ROS: 


CHIEF COMPLAINT: abdominal pain, bloating and distention





HISTORY OF PRESENT ILLNESS:  Patient is a 42-year-old female who has had 6 

months of abdominal pain and distention. This was gradual onset.  It is 

constant in duration but waxes and wanes from mild to severe.  It is currently 

rated as severe distension.  Sometimes worse after she eats.  Sometimes 

improves when NPO, at other times does not.  Patient reports she ate a small 

amount of chicken and salad tonight.  Associated with nausea but no vomiting. 

No fevers or chills. No bloody stools or emesis.  No diarrhea.  It is 

associated with intermittent constipation.  Patient reports she had no bowel 

movement x3 days. She is status post hysterectomy in 2015.  She has been worked 

up by Internal Medicine, GI and Gynecology.  Gastroenterologist feel that there 

might be a colonic transit abnormality, but she has only been on stool 

softeners and glycerin suppositories.  No stimulant laxatives.  No trauma or 

injury. No other associated complaints or modifying factors.  Patient reports 

there is nothing new or different about the pain she is having today from 

previous episodes.





REVIEW OF SYSTEMS:


A comprehensive 10 point review of systems is otherwise negative aside from 

elements mentioned in the history of present illness.





Source: Patient


Exam Limitations: No limitations





- Personal History


LMP (Females 10-55): Hysterectomy


Current Tetanus/Diphtheria Vaccine: Yes


Tetanus Vaccine Date: 2010





- Medical/Surgical History


Hx Asthma: No


Hx Chronic Respiratory Disease: No


Hx Diabetes: No


Hx Cardiac Disease: No


Hx Renal Disease: No


Hx Cirrhosis: No


Hx Alcoholism: No


Hx HIV/AIDS: No


Hx Splenectomy or Spleen Trauma: No


Other PMH: left ovarian cyst, hysterectomy-prolapse.  laxative/ stool softener 

dependent, chronic constipation,.  abdominal distention 4-6 months





- Social History


Smoking Status: Never smoked





- Physical Exam


Exam: 





Physical Exam


Gen: Alert and Oriented, grimacing 


HEENT: PERRL, moist mucous membranes 


NECK: no meningismus


CV: regular rate and regular rhythm


PULM: CTAB, no wheezes 


ABDOMEN:  Diffusely distended, soft, hypoactive bowel sounds throughout


BACK: No CVA tenderness


NEURO:  Neurologically grossly intact 


EXTREMITIES: normal appearing


SKIN: no rash or break in skin on exposed skin


PSYCH: answers questions appropriately, flat affect, minimal eye contact.


Constitutional: 


 Initial Vital Signs











Temperature (C)  36.7 C   06/17/17 21:37


 


Heart Rate  86   06/17/17 21:37


 


Respiratory Rate  18   06/17/17 21:37


 


Blood Pressure  109/65   06/17/17 21:37


 


O2 Sat (%)  98   06/17/17 21:37








 











O2 Delivery Mode               Nasal Cannula














Allergies/Adverse Reactions: 


 





No Known Allergies Allergy (Verified 02/22/17 13:13)


 








Home Medications: 














 Medication  Instructions  Recorded


 


Docusate Sodium [Colace 100 MG (*)] 100 mg PO BID 02/22/17


 


Polyethylene Glycol 3350 [Miralax 17 gm PO BID 02/22/17





17 gm (*)]  


 


Herbals/Supplements -Info Only 1 ea PO DAILY 04/04/17


 


Linaclotide [Linzess] 290 mcg PO DAILY 04/04/17














Medical Decision Making





- Diagnostics


Imaging Results: 


 Imaging Impressions





Abdomen CT  06/17/17 22:20


Impression: 


1. Constipation. Distended stomach with food material. Per discussion with 

Lauren sidhu, the patient does not eat much this evening so consideration 

could be made for gastroparesis or bezoar. Similar appearance to April 2017.


2. Several cysts or follicles in the left ovary, largest measuring 3.5 cm.


 


Results called and discussed with Lauren Blake NP at 6/17/2017 22:53.











Imaging: Discussed imaging studies w/ On call Radiologist


ED Course/Re-evaluation: 





IV established, CBC, chemistry panel, urinalysis obtained, CT abdomen pelvis 

with IV contrast ordered.  The patient is given 0.5 mg of Dilaudid and 4 mg of 

Zofran.  1 L of normal saline is hanging.





Labs are unremarkable, urinalysis normal. CT abdomen pelvis shows constipation, 

no evidence of small-bowel obstruction.  Patient was given another 0.5 mg of 

Dilaudid.  Patient is refusing an enema as she reports this causes her too much 

pain.   Patient is given another dose 0.5 mg of Dilaudid.  She is given a 

bottle of magnesium citrate to start drinking to when she gets home.  Patient 

reports her discomfort has improved.  I discussed the importance of drinking 

plenty of water, following up with her gastroenterologist, doing her bowel 

regimen routinely.  Patient is given strict return precautions for worsening 

symptoms, vomiting, fevers, new symptoms or concerns.





- Data Points


Laboratory Results: 


 Laboratory Results





 06/17/17 21:50 





 06/17/17 21:50 





 











  06/17/17 06/17/17 06/17/17





  22:20 21:50 21:50


 


WBC      





    


 


RBC      





    


 


Hgb      





    


 


Hct      





    


 


MCV      





    


 


MCH      





    


 


MCHC      





    


 


RDW      





    


 


Plt Count      





    


 


MPV      





    


 


Neut % (Auto)      





    


 


Lymph % (Auto)      





    


 


Mono % (Auto)      





    


 


Eos % (Auto)      





    


 


Baso % (Auto)      





    


 


Nucleat RBC Rel Count      





    


 


Absolute Neuts (auto)      





    


 


Absolute Lymphs (auto)      





    


 


Absolute Monos (auto)      





    


 


Absolute Eos (auto)      





    


 


Absolute Basos (auto)      





    


 


Absolute Nucleated RBC      





    


 


Immature Gran %      





    


 


Immature Gran #      





    


 


Sodium      144 mEq/L mEq/L





     (134-144) 


 


Potassium      4.4 mEq/L mEq/L





     (3.5-5.2) 


 


Chloride      109 mEq/L mEq/L





     () 


 


Carbon Dioxide      21 mEq/l L mEq/l





     (22-31) 


 


Anion Gap      14 mEq/L mEq/L





     (8-16) 


 


BUN      16 mg/dL mg/dL





     (7-23) 


 


Creatinine      1.2 mg/dL H mg/dL





     (0.6-1.0) 


 


Estimated GFR      49 





    


 


Glucose      70 mg/dL mg/dL





     () 


 


Calcium      10.0 mg/dL mg/dL





     (8.5-10.4) 


 


Beta HCG, Qual    NEGATIVE   





    


 


Urine Color  PALE YELLOW     





    


 


Urine Appearance  CLEAR     





    


 


Urine pH  6.0     





   (5.0-7.5)   


 


Ur Specific Gravity  1.005     





   (1.002-1.030)   


 


Urine Protein  NEGATIVE     





   (NEGATIVE)   


 


Urine Ketones  NEGATIVE     





   (NEGATIVE)   


 


Urine Blood  NEGATIVE     





   (NEGATIVE)   


 


Urine Nitrate  NEGATIVE     





   (NEGATIVE)   


 


Urine Bilirubin  NEGATIVE     





   (NEGATIVE)   


 


Urine Urobilinogen  NEGATIVE EU EU    





   (0.2-1.0)   


 


Ur Leukocyte Esterase  NEGATIVE     





   (NEGATIVE)   


 


Urine Glucose  NEGATIVE     





   (NEGATIVE)   














  06/17/17





  21:50


 


WBC  6.59 10^3/uL 10^3/uL





   (3.80-9.50) 


 


RBC  4.37 10^6/uL 10^6/uL





   (4.18-5.33) 


 


Hgb  13.7 g/dL g/dL





   (12.6-16.3) 


 


Hct  40.5 % %





   (38.0-47.0) 


 


MCV  92.7 fL fL





   (81.5-99.8) 


 


MCH  31.4 pg pg





   (27.9-34.1) 


 


MCHC  33.8 g/dL g/dL





   (32.4-36.7) 


 


RDW  12.6 % %





   (11.5-15.2) 


 


Plt Count  319 10^3/uL 10^3/uL





   (150-400) 


 


MPV  9.0 fL fL





   (8.7-11.7) 


 


Neut % (Auto)  51.1 % %





   (39.3-74.2) 


 


Lymph % (Auto)  37.8 % %





   (15.0-45.0) 


 


Mono % (Auto)  8.3 % %





   (4.5-13.0) 


 


Eos % (Auto)  2.0 % %





   (0.6-7.6) 


 


Baso % (Auto)  0.6 % %





   (0.3-1.7) 


 


Nucleat RBC Rel Count  0.0 % %





   (0.0-0.2) 


 


Absolute Neuts (auto)  3.37 10^3/uL 10^3/uL





   (1.70-6.50) 


 


Absolute Lymphs (auto)  2.49 10^3/uL 10^3/uL





   (1.00-3.00) 


 


Absolute Monos (auto)  0.55 10^3/uL 10^3/uL





   (0.30-0.80) 


 


Absolute Eos (auto)  0.13 10^3/uL 10^3/uL





   (0.03-0.40) 


 


Absolute Basos (auto)  0.04 10^3/uL 10^3/uL





   (0.02-0.10) 


 


Absolute Nucleated RBC  0.00 10^3/uL 10^3/uL





   (0-0.01) 


 


Immature Gran %  0.2 % %





   (0.0-1.1) 


 


Immature Gran #  0.01 10^3/uL 10^3/uL





   (0.00-0.10) 


 


Sodium  





  


 


Potassium  





  


 


Chloride  





  


 


Carbon Dioxide  





  


 


Anion Gap  





  


 


BUN  





  


 


Creatinine  





  


 


Estimated GFR  





  


 


Glucose  





  


 


Calcium  





  


 


Beta HCG, Qual  





  


 


Urine Color  





  


 


Urine Appearance  





  


 


Urine pH  





  


 


Ur Specific Gravity  





  


 


Urine Protein  





  


 


Urine Ketones  





  


 


Urine Blood  





  


 


Urine Nitrate  





  


 


Urine Bilirubin  





  


 


Urine Urobilinogen  





  


 


Ur Leukocyte Esterase  





  


 


Urine Glucose  





  











Medications Given: 


 








Discontinued Medications





Hydromorphone HCl (Dilaudid)  0.5 mg IVP EDNOW ONE


   Stop: 06/17/17 22:21


   Last Admin: 06/17/17 22:27 Dose:  0.5 mg


Hydromorphone HCl (Dilaudid)  0.5 mg IVP EDNOW ONE


   Stop: 06/17/17 23:01


   Last Admin: 06/17/17 23:04 Dose:  0.5 mg


Sodium Chloride (Ns)  1,000 mls @ 0 mls/hr IV ONCE ONE; Wide Open


   PRN Reason: Protocol


   Stop: 06/17/17 22:25


   Last Admin: 06/17/17 22:27 Dose:  1,000 mls


Magnesium Citrate (Magnesium Citrate)  300 ml PO ONCE ONE


   Stop: 06/18/17 00:02


   Last Admin: 06/18/17 00:24 Dose:  300 ml


Ondansetron HCl (Zofran)  4 mg IVP EDNOW ONE


   Stop: 06/17/17 21:59


   Last Admin: 06/17/17 22:00 Dose:  4 mg


Ondansetron HCl (Zofran)  4 mg IVP EDNOW ONE


   Stop: 06/18/17 00:02


   Last Admin: 06/18/17 00:12 Dose:  4 mg








Departure





- Departure


Disposition: Home, Routine, Self-Care


Clinical Impression: 


Constipation


Qualifiers:


 Constipation type: unspecified constipation type Qualified Code(s): K59.00 - 

Constipation, unspecified





Condition: Good


Instructions:  Constipation (ED)


Additional Instructions: 


Drink your magnesium citrate, take 3 doses of MiraLax today back in, tomorrow 

and Monday.  Drink plenty of fluids.  Follow up with your gastroenterologist at 

1st available appointment.  Return to the emergency department for worsening 

symptoms, new symptoms or concerns.


Referrals: 


Izzy Lovell MD [Primary Care Provider] - As per Instructions

## 2018-01-01 NOTE — GHP
[f 
rep st]



                                                            HISTORY AND PHYSICAL





DATE OF ADMISSION:  01/01/2018



CHIEF COMPLAINT:  Abdominal distention.



HISTORY OF PRESENT ILLNESS:  The patient is a 42-year-old female with a history 
of chronic constipation who came into the emergency department today for 
evaluation after having worsening constipation, bloating, and last reported 
bowel movement on 12/26/2017.  She has been admitted previously for similar 
symptoms.  Last admission was in April of 2017.  She reports since that 
admission, she has seen a colorectal specialist in [place], Dr. Paulino, and 
has some sort of stimulator in place.  She denies any change in her diet or 
medications recently.  She is in a significant amount of discomfort and also 
quite nauseous when I am seeing her, so it is difficult to get a full history 
from her. Thus some of this information is taken from previous records also.



She has been tried on multiple different oral medications including Reglan, 
Linzess, Amitiza, MiraLAX, but reports that none of them have worked 
particularly well.  She is currently taking MiraLAX as able.  She denies taking 
any stimulant laxatives at this time. 



Dr. Paulino along with Dr. Cardenas have been involved in her care.  Her primary 
care provider is Isabel Diaz.  



REVIEW OF SYSTEMS:  Attempted to do a complete 10-point review of systems, as 
she is feeling quite poorly and was unable to answer most questions.  She is 
having nausea but without any bilious vomiting.  She is mainly spitting up 
mucus.  She denied any recent fever, chills, headache, or cough.



PAST MEDICAL HISTORY:  Very remote history of Henoch-Schonlein purpura.  
Uterine prolapse, status post hysterectomy.  Chronic constipation with a 
stimulator in place.



ALLERGIES:  No known drug allergy.



PAST SURGICAL HISTORY:  As above.



SOCIAL HISTORY:  She is  and has 4 children.  No reported alcohol, 
cigarette, narcotic, or other drug use.



FAMILY HISTORY:  Unobtainable at this point.



MEDICATIONS:  MiraLAX and Dulcolax.



OBJECTIVE:  

VITAL SIGNS:  Blood pressure 111/65.  She is 99% on room air.  Temperature is 
97.8.  Respiratory rate 16.  Heart rate is 90.  

GENERAL:  She is a thin female in mod distress with nausea and retching.  

HEENT:  Normocephalic, atraumatic.  Extraocular movements are intact.  
Oropharynx was moist.  NECK:  Supple.  No lymphadenopathy.  

CARDIOVASCULAR:  Regular rate without murmur.  

LUNGS:  Clear to auscultation bilaterally.  

ABDOMEN:  Distended and tender to palpation throughout, but not tense.  No 
hepatosplenomegaly appreciated.  Normoactive bowel sounds noted.  

BREASTS:  Exam was deferred.  

:  Exam was deferred.  

SKIN:  Shows no visible rash or lesions.  

NEURO:  Grossly intact.



LABS:  White blood cell is 10.5, hemoglobin 13.9, hematocrit 40.2, and platelet 
308.  Chemistry is sodium 141, potassium 4.2, chloride 103, CO2 of 23, BUN of 9
, creatinine 0.7, glucose 90.  LFTs were normal.  TSH was done in April of 2017 
and was slightly elevated at 5.81.  Repeat was done in 07/2017 and was normal 
at 2.54.



IMAGING:  Abdomen CT shows constipation without bowel obstruction or 
dilatation.  Moderate gastric distention with fluid.  No evidence of 
obstruction.  Images were personally reviewed and agree with interpretation.



ASSESSMENT AND PLAN:  

Chronic constipation with current obstipation.  She was admitted to the 
hospital for further evaluation and management.  I will hold off on narcotic 
pain medications at this time given her already slow bowel transit.  Certainly 
can consider adding that if needed.  Will continue with IV fluids.  An 
aggressive bowel protocol has been started, assuming that she will be able to 
take things by mouth.  However, if nausea and vomiting continue, likely will 
need an NG placement and n.p.o.  Will contact Gastroenterology in the morning 
if her symptoms are not improving.



DISPOSITION:  Likely greater than 2 midnights needed due to the severity of her 
constipation.  

She is a full code.  

DVT prophylaxis with TEDs.





Job #:  [id number]

MTDD

## 2018-01-01 NOTE — EDPHY
H & P


Stated Complaint: abd distention bloating/difficulty with stooling


Time Seen by Provider: 01/01/18 11:33


HPI/ROS: 


HPI:  This is a 42-year-old female presents with





Chief Complaint: abd distention bloating/difficulty with stooling





Location:  Abdominal


Quality:  Distension


Duration:  2-3 days


Signs and Symptoms: no fever, + nausea, no vomiting, no hematemesis, no blood 

in stool, + abdominal bloating, no diarrhea, + back pain, no urinary symptoms, 

no vaginal bleeding/discharge, no indigestion, no chest pain, + mild shortness 

of breath


Timing:  Rapidly worsening


Severity:  Severe


Context:  Patient has a history of chronic constipation, stool softener 

dependency presents with complaints of 2-3 day history of rapidly worsening 

abdominal distention, bloating and no bowel movement since the 26th.  On the 26 

she only had a small amount of liquid stool.  She reports that she has been 

passing small amount of flashes until today which she has not passed any.  She 

denies any episodes of diarrhea/blood in stool/fever.  She reports that her 

abdominal discomfort is moderate in nature-nonradiating and constant.  She 

reports that she has seen multiple GI specialists and even has a spinal 

stimulator that does not seem to be working for her chronic constipation.  She 

normally takes 4-5 Bisacodyl tablets and MiraLax daily and within 24 hr will 

have a bowel movement but this has not worked.  Chart review shows that in 

April she was admitted for significant gastric distention/obstipation requiring 

NG tube placement for bowel decompression.  Unable to eat today due to pain and 

distension.


Modifying Factors:  Stool softener





Comment: 








ROS: see HPI


Constitutional: No fever, no chills, no weight loss


Eyes:  No blurred vision


Respiratory:  No shortness of breath, no cough


Cardiovascular:  No chest pain, no palpitations


Gastrointestinal:  + nausea, no vomiting, no diarrhea, no hematemesis, no blood 

in stool 


Genitourinary:  No dysuria, no blood in urine 


Extremities:  No myalgias, no edema


Neurologic:  No weakness, no numbness


Skin:  No rashes, no petechiae


Hematologic:  No bruising, no bleeding





MEDICAL/SURGICAL/SOCIAL HISTORY: 


Medical history:  left ovarian cyst, hysterectomy-prolapse, laxative/ stool 

softener dependent, chronic constipation, abdominal distention 4-6 months


Surgical history:  Bladder mesh


Social history:  Employed. 





CONSTITUTIONAL:  Pleasant adult white female, nontoxic in appearance, awake and 

alert, no obvious distress


HEENT: Atraumatic and normocephalic, PERRL, EOMI. Tympanic membranes clear. 

Oropharynx clear, no exudate and moist pink mucosa.  Airway patent.  No 

lymphadenopathy.  No meningismus.


Cardiovascular: Normal S1/S2, regular rate, regular rhythm, without murmur rub 

or gallop.


PULMONARY/CHEST:  Symmetrical and nontender. Clear to auscultation bilaterally. 

Good air movement. No accessory muscle usage.


ABDOMEN:  Soft, moderately distended, mild nonfocal generalized tenderness, + 

tympanic, no rebound, no guarding, no peritoneal signs, no masses or 

organomegaly. No CVAT. Hypoactive bowel sounds heard in the lower quadrants 

only.


EXTREMITIES:  2/2 pulses, strength 5/5, no deformities, no clubbing, no 

cyanosis or edema.


NEUROLOGICAL: no focal neuro deficits.  GCS 15.


SKIN: Warm and dry, no erythema. no rash.  Good capillary refill. 


  





Source: Patient


Exam Limitations: No limitations





- Personal History


LMP (Females 10-55): Hysterectomy


Current Tetanus/Diphtheria Vaccine: Yes


Tetanus Vaccine Date: 2010





- Medical/Surgical History


Hx Asthma: No


Hx Chronic Respiratory Disease: No


Hx Diabetes: No


Hx Cardiac Disease: No


Hx Renal Disease: No


Hx Cirrhosis: No


Hx Alcoholism: No


Hx HIV/AIDS: No


Hx Splenectomy or Spleen Trauma: No


Other PMH: left ovarian cyst, hysterectomy-prolapse.  laxative/ stool softener 

dependent, chronic constipation,.  abdominal distention 4-6 months





- Social History


Smoking Status: Never smoked


Constitutional: 


 Initial Vital Signs











Temperature (C)  36.4 C   01/01/18 11:16


 


Heart Rate  87   01/01/18 11:16


 


Respiratory Rate  20   01/01/18 11:16


 


Blood Pressure  116/88 H  01/01/18 11:16


 


O2 Sat (%)  100   01/01/18 11:16








 











O2 Delivery Mode               Room Air














Allergies/Adverse Reactions: 


 





No Known Allergies Allergy (Verified 01/01/18 11:14)


 








Home Medications: 














 Medication  Instructions  Recorded


 


Bisacodyl  01/01/18


 


Miralax 17 gm (*)  01/01/18














Medical Decision Making





- Diagnostics


Imaging Results: 


 Imaging Impressions





Abdomen CT  01/01/18 11:35


Impression:


1. Constipation without bowel obstruction or dilation.


2. Moderate gastric distention with fluid.


3. No CT evidence of appendicitis, abscess, or bowel obstruction.


 


Findings and recommendations discussed with emergency department physician 

assistant, Jessica Rodriguez PA-C at  1311 hours, January 1, 2018.


 


Final report concurs with initial preliminary interpretation.











ED Course/Re-evaluation: 


Labs, IV fluids, IV medications, CT abdomen and pelvis scan ordered


Patient is afebrile and no systemic signs. 


Given 1 L normal saline, IV Dilaudid, IV Zofran upon arrival


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  Patient may need admission for 

NG tube placement and bowel decompression. 


1310:  Called by Radiology who advised that CT abdomen and pelvis scan shows no 

signs of his obstruction but does show free fluid and significant distension; 

slightly less than the 1 in April. 


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 

obstipation. Spoke with hospitalist who advised to placed on med surg in 

observation status with Dr. Calderon who will kindly agreed to provide further 

care. 








This patient was seen under the supervision of my secondary supervising 

physician.  I evaluated care for this patient independently.  Discussed this 

patient with Dr. Powell who did not see the patient.  atient's presentation, 

labs/imaging, treatment and plan of care were discussed with secondary 

supervising physician. 

















Differential Diagnosis: 


Differential diagnosis includes but is not limited is cell bowel obstruction, 

obstipation, constipation, Dannie syndrome. 








- Data Points


Laboratory Results: 


 Laboratory Results





 01/01/18 12:00 





 01/01/18 12:00 





 











  01/01/18 01/01/18





  12:00 12:00


 


WBC    10.47 10^3/uL H 10^3/uL





    (3.80-9.50) 


 


RBC    4.37 10^6/uL 10^6/uL





    (4.18-5.33) 


 


Hgb    13.9 g/dL g/dL





    (12.6-16.3) 


 


Hct    40.2 % %





    (38.0-47.0) 


 


MCV    92.0 fL fL





    (81.5-99.8) 


 


MCH    31.8 pg pg





    (27.9-34.1) 


 


MCHC    34.6 g/dL g/dL





    (32.4-36.7) 


 


RDW    12.8 % %





    (11.5-15.2) 


 


Plt Count    308 10^3/uL 10^3/uL





    (150-400) 


 


MPV    8.7 fL fL





    (8.7-11.7) 


 


Neut % (Auto)    80.1 % H %





    (39.3-74.2) 


 


Lymph % (Auto)    12.0 % L %





    (15.0-45.0) 


 


Mono % (Auto)    5.7 % %





    (4.5-13.0) 


 


Eos % (Auto)    1.5 % %





    (0.6-7.6) 


 


Baso % (Auto)    0.2 % L %





    (0.3-1.7) 


 


Nucleat RBC Rel Count    0.0 % %





    (0.0-0.2) 


 


Absolute Neuts (auto)    8.38 10^3/uL H 10^3/uL





    (1.70-6.50) 


 


Absolute Lymphs (auto)    1.26 10^3/uL 10^3/uL





    (1.00-3.00) 


 


Absolute Monos (auto)    0.60 10^3/uL 10^3/uL





    (0.30-0.80) 


 


Absolute Eos (auto)    0.16 10^3/uL 10^3/uL





    (0.03-0.40) 


 


Absolute Basos (auto)    0.02 10^3/uL 10^3/uL





    (0.02-0.10) 


 


Absolute Nucleated RBC    0.00 10^3/uL 10^3/uL





    (0-0.01) 


 


Immature Gran %    0.5 % %





    (0.0-1.1) 


 


Immature Gran #    0.05 10^3/uL 10^3/uL





    (0.00-0.10) 


 


Sodium  141 mEq/L mEq/L  





   (134-144)  


 


Potassium  4.2 mEq/L mEq/L  





   (3.5-5.2)  


 


Chloride  108 mEq/L mEq/L  





   ()  


 


Carbon Dioxide  23 mEq/l mEq/l  





   (22-31)  


 


Anion Gap  10 mEq/L mEq/L  





   (8-16)  


 


BUN  9 mg/dL mg/dL  





   (7-23)  


 


Creatinine  0.7 mg/dL mg/dL  





   (0.6-1.0)  


 


Estimated GFR  > 60   





   


 


Glucose  90 mg/dL mg/dL  





   ()  


 


Calcium  9.5 mg/dL mg/dL  





   (8.5-10.4)  


 


Total Bilirubin  0.6 mg/dL mg/dL  





   (0.1-1.4)  


 


Conjugated Bilirubin  0.2 mg/dL mg/dL  





   (0.0-0.5)  


 


Unconjugated Bilirubin  0.4 mg/dL mg/dL  





   (0.0-1.1)  


 


AST  19 IU/L IU/L  





   (14-46)  


 


ALT  29 IU/L IU/L  





   (9-52)  


 


Alkaline Phosphatase  41 IU/L IU/L  





   ()  


 


Total Protein  7.4 g/dL g/dL  





   (6.3-8.2)  


 


Albumin  4.3 g/dL g/dL  





   (3.5-5.0)  


 


Lipase  86 IU/L IU/L  





   ()  











Medications Given: 


 








Discontinued Medications





Hydromorphone HCl (Dilaudid)  1 mg IVP EDNOW ONE


   Stop: 01/01/18 12:10


   Last Admin: 01/01/18 12:16 Dose:  1 mg


Sodium Chloride (Ns)  1,000 mls @ 0 mls/hr IV EDNOW ONE; Wide Open


   PRN Reason: Protocol


   Stop: 01/01/18 11:36


   Last Admin: 01/01/18 12:06 Dose:  1,000 mls








Departure





- Departure


Disposition: [facility] Inpatient Acute


Clinical Impression: 


 Chester's syndrome, Obstipation





Constipation


Qualifiers:


 Constipation type: unspecified constipation type Qualified Code(s): K59.00 - 

Constipation, unspecified





Condition: Fair

## 2018-01-02 NOTE — ASMTCASEMG
Living Arrangements

 

What is your living           Answers:  With Child(giles)                       

arrangement? Who do you                                                       

live with?                                                                    

Type Of Residence

 

What kind of residence do     Answers:  House                                 

you live in?                                                                  

Discharge Plan Comments

 

Coordination Status           

Comments                      

Notes:

Patient is a 43yo female who was admitted for chronic constipation with current obstipation. No 

therapies have been ordered. Patient will likely d/c independent. CM will follow for d/c needs that 


arise.

 

Date Signed:  01/02/2018 09:57 AM

Electronically Signed By:Christina Black LCSW

## 2018-01-02 NOTE — PDMN
Medical Necessity


Medical necessity: change to IP; los>2mn for obstipation, with continued nausea

; requires IVF, aggressive bowel protocol, GI consult, and progression of diet 

when having BM's and tolerating CL; hx chronic constipation;per order and 

progress note 1/2/18

## 2018-01-02 NOTE — SOAPPROG
SOAP Progress Note


Assessment/Plan: 





Chronic constipation with current obstipation but no obstruction


   - IV fluids, aggressive bowel protocol with addition of golytly if tolerated


   -consult to GI done, Dr Licona will see her


   -ok to advance diet if having BMs and tolerating clears/PO


   -FU with Dr Alejandro re possible surgical interventions





Full code.  


DVT prophylaxis with TEDs.


DISPO- hopeful dc tomorrow if has BMs, but will also need to await GI 

recommendations


Subjective: 





Still nauseous but no significant vomiting and feels more 'on top of it' with 

zofran. Says saw Dr Alejandro in Dec and stimulator placed in Sept not 

functioning. She had discussed surgical intervention but Yani is very nervous 

to do surgery. 


Objective: 





 Vital Signs











Temp Pulse Resp BP Pulse Ox


 


 98.9 F   85   15   88/57 L  97 


 


 01/02/18 15:02  01/02/18 15:02  01/02/18 15:02  01/02/18 15:02  01/02/18 15:02








 Laboratory Results





 01/02/18 05:00 





 











 01/01/18 01/02/18 01/03/18





 11:59 11:59 11:59


 


Intake Total  1020 


 


Output Total  0 


 


Balance  1020 














- Time Spent With Patient


Time Spent With Patient: 





30





- Pending Discharge


Pending Discharge Within 48 Hours: Yes


Pending Discharge Date: 01/04/18


Pending Discharge Time: 11:00





Physical Exam





- Physical Exam


General Appearance: alert, mild distress (but improved/more comfortable 

appearing since admission)


Respiratory: lungs clear, normal breath sounds, No respiratory distress


Cardiac/Chest: regular rate, rhythm, No edema


Abdomen: normal bowel sounds, soft, distended, other (mild ttp throughout), No 

guarding


Skin: warm/dry


Neuro/Psych: alert, normal mood/affect, No cognition abnormalities, No speech 

abnormalities





ICD10 Worksheet


Patient Problems: 


 Problems











Problem Status Onset


 


Constipation Acute  


 


Obstipation Acute  


 


Dannie's syndrome Acute  


 


Abdominal pain Acute

## 2018-01-03 NOTE — GDS
[f 
rep st]



                                                             DISCHARGE SUMMARY





DIAGNOSES:  

1.  Chronic constipation/obstipation with nerve stimulator.

2.  Remote history of Henoch-Schonlein purpura.



HISTORY OF PRESENT ILLNESS:  A 42-year-old female with history of chronic 
constipation, who came to the ER, 01/01/2018, after worsening constipation, 
bloating, and last reported bowel movement 12/26/2017.  She had been admitted 
previously for similar symptoms, last was April 2017.  She is followed by a 
colorectal specialist, Dr. Paulino, and had a nerve stimulator placed on 
September 11, 2017.  Despite the stimulator, she is still having persistent 
constipation, where she is taking 5-10 bisacodyl suppositories to have 
significant bowel movements.  She denies chronic opioid use, drinking plenty of 
fluids.



HOSPITAL COURSE BY PROBLEM:  

1. Chronic constipation/obstipation:  The patient is now having bowel movements 
with GoLYTELY.  Her cramping and distention have improved.  She is tolerating 
p.o.  CT abdomen was negative for appendicitis, abscess or obstruction.  She is 
followed by Dr. Paulino, as well as Dr. Cardenas.  Recommend she follow up with 
both of them at discharge.  I will provide a prescription for GoLYTELY, as this 
has been successful for her here.  I advised her to drink plenty of fluids.



DISPOSITION:  Patient is stable for discharge today.





Job #:  [id number]

MTDD

## 2018-01-03 NOTE — HOSPPROG
Hospitalist Progress Note


Assessment/Plan: 





#Chronic constipation


-h/o stimulator placement  Sept 11


-FU with Gatof


having BMs with Golytely. Will provide Rx








#DC today


Subjective: having BMs, less distended. Tolerating PO. Wants to go home


Objective: 


 Vital Signs











Temp Pulse Resp BP Pulse Ox


 


 36.7 C   76   16   94/60 L  97 


 


 01/03/18 08:00  01/03/18 08:00  01/03/18 08:00  01/03/18 08:00  01/03/18 08:00














- Physical Exam


Constitutional: no apparent distress


Eyes: PERRL


Ears, Nose, Mouth, Throat: moist mucous membranes, hearing normal


Cardiovascular: regular rate and rhythym, no murmur, rub, or gallop


Respiratory: no respiratory distress, no rales or rhonchi


Gastrointestinal: normoactive bowel sounds, distension, No no palpable masses, 

No tenderness


Genitourinary: no bladder fullness


Skin: warm


Musculoskeletal: full muscle strength


Neurologic: AAOx3, CN II-XII Intact


Psychiatric: interacting appropriately





ICD10 Worksheet


Patient Problems: 


 Problems











Problem Status Onset


 


Constipation Acute  


 


Obstipation Acute  


 


Dannie's syndrome Acute  


 


Abdominal pain Acute

## 2018-06-18 ENCOUNTER — HOSPITAL ENCOUNTER (EMERGENCY)
Dept: HOSPITAL 80 - FED | Age: 43
Discharge: HOME | End: 2018-06-18
Payer: MEDICAID

## 2018-06-18 VITALS — DIASTOLIC BLOOD PRESSURE: 60 MMHG | SYSTOLIC BLOOD PRESSURE: 99 MMHG

## 2018-06-18 DIAGNOSIS — E86.9: ICD-10-CM

## 2018-06-18 DIAGNOSIS — R10.33: Primary | ICD-10-CM

## 2018-06-18 LAB — PLATELET # BLD: 322 10^3/UL (ref 150–400)

## 2018-06-18 NOTE — EDPHY
H & P


Time Seen by Provider: 06/18/18 16:43


HPI/ROS: 





CHIEF COMPLAINT:  Abdominal pain





HISTORY OF PRESENT ILLNESS:  The patient is a 43-year-old female with the 

history of colectomy in April of 2018 for chronic issues with constipation who 

presents to the emergency department with periumbilical discomfort.  The 

patient has been doing well since her surgery.  She has been tolerating food 

well.  However, over the past 2 days her pain is getting worse.  It is waxing 

waning.  It is not related to food.  She is feels worse when she performs a sit 

up.  She has not noticed any bulge.  No fevers or chills.  No dysuria frequency.





REVIEW OF SYSTEMS:  


My complete review of systems is negative except as mentioned in the HPI.


Past Medical/Surgical History: 





Ovarian cyst, prolapsed uterus, chronic constipation, abdominal distention





Past surgical history:  Colon resection, nerve stimulator for chronic 

constipation








Smoking Status: Never smoked


Physical Exam: 





36.5, 109/65, 93, 16, 96% on room air


GENERAL:  No acute distress, alert.


HEENT:  Eyes normal to inspection, normal pharynx, no signs of dehydration.


NECK:  No thyromegaly, no lymphadenopathy, supple.


RESPIRATORY:  Clear to auscultation bilaterally, no rales, rhonchi or wheezing.


CVS:  Regular rate and rhythm, no rubs, murmurs, or gallops.


ABDOMEN:  Soft, mild periumbilical tenderness to palpation with no rebound or 

guarding, nondistended, no organomegaly.  No palpable hernia.


BACK:  Normal to inspection, no CVA tenderness.


SKIN:  Normal color, no rash, warm, dry.  No pallor.


EXTREMITIES:  No pedal edema, no calf tenderness, no Homans sign or cords, no 

joint swelling.


NEURO/PSYCH:  Alert and oriented x3, normal mood and affect, normal motor 

sensory exam.  No obvious cranial nerve deficit.


Constitutional: 


 Initial Vital Signs











Temperature (C)  36.5 C   06/18/18 16:24


 


Heart Rate  93   06/18/18 16:24


 


Respiratory Rate  16   06/18/18 16:24


 


Blood Pressure  109/65   06/18/18 16:24


 


O2 Sat (%)  96   06/18/18 16:24








 











O2 Delivery Mode               Room Air














Allergies/Adverse Reactions: 


 





No Known Allergies Allergy (Verified 01/01/18 11:14)


 








Home Medications: 














 Medication  Instructions  Recorded


 


Probiotic  06/18/18














Medical Decision Making


ED Course/Re-evaluation: 





In the emergency department I discussed possible etiologies with the patient.  

I answered all her questions.  IV was placed.  Laboratory studies were 

obtained.  A CT of the abdomen pelvis was ordered





CBC and chemistry unremarkable.  LFTs are normal.





CT of the abdomen pelvis:  Please refer the dictated report by Dr. Dhaliwal.  No 

acute disease noted.  No obstruction.  No obvious hernia.





Rechecked the patient.  She was doing well.  She had no new complaints.  No 

nausea vomiting while here.  Patient was given warnings prior to leaving.  She 

will return with worsening symptoms.  She will follow up with her surgeon.


Differential Diagnosis: 





My differential includes but is not limited to small-bowel obstruction, 

perforation, hematoma, hernia, incarcerated hernia, strangulated hernia





- Data Points


Laboratory Results: 


 Laboratory Results





 06/18/18 18:00 





 06/18/18 18:00 





 











  06/18/18 06/18/18 06/18/18





  18:07 18:00 18:00


 


WBC      





    


 


RBC      





    


 


Hgb      





    


 


POC Hgb  13.9 gm/dL gm/dL    





   (12.6-16.3)   


 


Hct      





    


 


POC Hct  41 % %    





   (38-47)   


 


MCV      





    


 


MCH      





    


 


MCHC      





    


 


RDW      





    


 


Plt Count      





    


 


MPV      





    


 


Neut % (Auto)      





    


 


Lymph % (Auto)      





    


 


Mono % (Auto)      





    


 


Eos % (Auto)      





    


 


Baso % (Auto)      





    


 


Nucleat RBC Rel Count      





    


 


Absolute Neuts (auto)      





    


 


Absolute Lymphs (auto)      





    


 


Absolute Monos (auto)      





    


 


Absolute Eos (auto)      





    


 


Absolute Basos (auto)      





    


 


Absolute Nucleated RBC      





    


 


Immature Gran %      





    


 


Immature Gran #      





    


 


POC Sodium  140 mEq/L mEq/L    





   (135-145)   


 


Sodium      138 mEq/L mEq/L





     (135-145) 


 


POC Potassium  3.9 mEq/L mEq/L    





   (3.3-5.0)   


 


Potassium      4.0 mEq/L mEq/L





     (3.3-5.0) 


 


POC Chloride  105 mEq/L mEq/L    





   ()   


 


Chloride      107 mEq/L mEq/L





     () 


 


Carbon Dioxide      21 mEq/l L mEq/l





     (22-31) 


 


Anion Gap      10 mEq/L mEq/L





     (8-16) 


 


POC BUN  7 mg/dL mg/dL    





   (7-23)   


 


BUN      8 mg/dL mg/dL





     (7-23) 


 


Creatinine      0.7 mg/dL mg/dL





     (0.6-1.0) 


 


POC Creatinine  0.7 mg/dL mg/dL    





   (0.6-1.0)   


 


Estimated GFR      > 60 





    


 


Glucose      75 mg/dL mg/dL





     () 


 


POC Glucose  81 mg/dL mg/dL    





   ()   


 


Calcium      9.1 mg/dL mg/dL





     (8.5-10.4) 


 


Total Bilirubin      0.9 mg/dL mg/dL





     (0.1-1.4) 


 


Conjugated Bilirubin      0.2 mg/dL mg/dL





     (0.0-0.5) 


 


Unconjugated Bilirubin      0.7 mg/dL mg/dL





     (0.0-1.1) 


 


AST      20 IU/L IU/L





     (14-46) 


 


ALT      31 IU/L IU/L





     (9-52) 


 


Alkaline Phosphatase      43 IU/L IU/L





     () 


 


Total Protein      7.2 g/dL g/dL





     (6.3-8.2) 


 


Albumin      4.2 g/dL g/dL





     (3.5-5.0) 


 


Lipase      110 IU/L IU/L





     () 


 


Beta HCG, Qual    NEGATIVE   





    














  06/18/18





  18:00


 


WBC  6.75 10^3/uL 10^3/uL





   (3.80-9.50) 


 


RBC  4.32 10^6/uL 10^6/uL





   (4.18-5.33) 


 


Hgb  13.3 g/dL g/dL





   (12.6-16.3) 


 


POC Hgb  





  


 


Hct  40.3 % %





   (38.0-47.0) 


 


POC Hct  





  


 


MCV  93.3 fL fL





   (81.5-99.8) 


 


MCH  30.8 pg pg





   (27.9-34.1) 


 


MCHC  33.0 g/dL g/dL





   (32.4-36.7) 


 


RDW  13.0 % %





   (11.5-15.2) 


 


Plt Count  322 10^3/uL 10^3/uL





   (150-400) 


 


MPV  8.8 fL fL





   (8.7-11.7) 


 


Neut % (Auto)  52.7 % %





   (39.3-74.2) 


 


Lymph % (Auto)  36.9 % %





   (15.0-45.0) 


 


Mono % (Auto)  7.3 % %





   (4.5-13.0) 


 


Eos % (Auto)  2.2 % %





   (0.6-7.6) 


 


Baso % (Auto)  0.6 % %





   (0.3-1.7) 


 


Nucleat RBC Rel Count  0.0 % %





   (0.0-0.2) 


 


Absolute Neuts (auto)  3.56 10^3/uL 10^3/uL





   (1.70-6.50) 


 


Absolute Lymphs (auto)  2.49 10^3/uL 10^3/uL





   (1.00-3.00) 


 


Absolute Monos (auto)  0.49 10^3/uL 10^3/uL





   (0.30-0.80) 


 


Absolute Eos (auto)  0.15 10^3/uL 10^3/uL





   (0.03-0.40) 


 


Absolute Basos (auto)  0.04 10^3/uL 10^3/uL





   (0.02-0.10) 


 


Absolute Nucleated RBC  0.00 10^3/uL 10^3/uL





   (0-0.01) 


 


Immature Gran %  0.3 % %





   (0.0-1.1) 


 


Immature Gran #  0.02 10^3/uL 10^3/uL





   (0.00-0.10) 


 


POC Sodium  





  


 


Sodium  





  


 


POC Potassium  





  


 


Potassium  





  


 


POC Chloride  





  


 


Chloride  





  


 


Carbon Dioxide  





  


 


Anion Gap  





  


 


POC BUN  





  


 


BUN  





  


 


Creatinine  





  


 


POC Creatinine  





  


 


Estimated GFR  





  


 


Glucose  





  


 


POC Glucose  





  


 


Calcium  





  


 


Total Bilirubin  





  


 


Conjugated Bilirubin  





  


 


Unconjugated Bilirubin  





  


 


AST  





  


 


ALT  





  


 


Alkaline Phosphatase  





  


 


Total Protein  





  


 


Albumin  





  


 


Lipase  





  


 


Beta HCG, Qual  





  











Medications Given: 


 








Discontinued Medications





Sodium Chloride (Ns)  500 mls @ 0 mls/hr IV EDNOW ONE; Wide Open


   PRN Reason: Protocol


   Stop: 06/18/18 17:07


   Last Admin: 06/18/18 17:17 Dose:  500 mls


Ketorolac Tromethamine (Toradol)  30 mg IVP EDNOW ONE


   Stop: 06/18/18 17:27


   Last Admin: 06/18/18 17:28 Dose:  30 mg





Point of Care Test Results: 


 Chemistry











  06/18/18





  18:07


 


POC Sodium  140 mEq/L mEq/L





   (135-145) 


 


POC Potassium  3.9 mEq/L mEq/L





   (3.3-5.0) 


 


POC Chloride  105 mEq/L mEq/L





   () 


 


POC BUN  7 mg/dL mg/dL





   (7-23) 


 


POC Creatinine  0.7 mg/dL mg/dL





   (0.6-1.0) 


 


POC Glucose  81 mg/dL mg/dL





   () 








 ISTAT H&H











  06/18/18





  18:07


 


POC Hgb  13.9 gm/dL gm/dL





   (12.6-16.3) 


 


POC Hct  41 % %





   (38-47) 














Departure





- Departure


Disposition: Home, Routine, Self-Care


Clinical Impression: 


Abdominal pain


Qualifiers:


 Abdominal location: periumbilical Qualified Code(s): R10.33 - Periumbilical 

pain





Condition: Good


Instructions:  Acute Abdominal Pain (ED)


Additional Instructions: 


Return with increasing pain, fever, vomiting or any other concerns.  You need 

close follow-up with your surgeon.


Referrals: 


Victorina Khan PA [Primary Care Provider] - As per Instructions

## 2018-07-08 ENCOUNTER — HOSPITAL ENCOUNTER (EMERGENCY)
Dept: HOSPITAL 80 - CED | Age: 43
Discharge: HOME | End: 2018-07-08
Payer: MEDICAID

## 2018-07-08 VITALS — SYSTOLIC BLOOD PRESSURE: 105 MMHG | DIASTOLIC BLOOD PRESSURE: 69 MMHG

## 2018-07-08 DIAGNOSIS — R21: Primary | ICD-10-CM

## 2018-07-08 DIAGNOSIS — Z20.7: ICD-10-CM

## 2018-07-08 NOTE — EDPHY
H & P


Time Seen by Provider: 07/08/18 11:11


HPI/ROS: 





43-year-old female presents complaining of exposure to scabies, this week when 

her daughter had scabies approximately 3 days ago and she presents today with 

some small bumps and itching in her left axilla.





No other rash, no fevers or chills.





Review of systems


As per HPI


General no fever no chills no weakness


HEENT no eye pain no eye discharge. No eye redness, no sore throat


Respiratory no cough, no shortness of breath


Cardiac no chest pain, no peripheral edema


GI no abdominal pain, no diarrhea, no constipation, no nausea, no vomiting


  no flank pain, no hematuria, no dysuria


Musculoskeletal no myalgias, no joint pain


Heme  no easy bruising, no easy bleeding


Endo no polyuria, no polydipsia


Skin positive rashes, no pruritus


Neuro no syncope, no dizziness, no headaches


Psych is no suicidal ideation, no homicidal ideation





Past Medical/Surgical History: 





GERD


Social History: 





Denies alcohol or drug use.


Smoking Status: Never smoked


Physical Exam: 





Alert and oriented in no acute distress nontoxic appearance, afebrile


Atraumatic normocephalic


Neck no JVD


Lungs clear to auscultation, no respiratory distress


Heart regular rate and rhythm


Extremities no cyanosis clubbing edema


Skin


Area approximately a 2 x 3 cm at anterior aspect of left axilla with several 

small papules


No pustules, no vesicles, no burrows


No lymphangitic streaks, no crusting, no drainage


Constitutional: 


 Initial Vital Signs











Temperature (C)  36.5 C   07/08/18 11:07


 


Heart Rate  99   07/08/18 11:07


 


Respiratory Rate  16   07/08/18 11:07


 


Blood Pressure  105/69   07/08/18 11:07


 


O2 Sat (%)  98   07/08/18 11:07








 











O2 Delivery Mode               Room Air














Allergies/Adverse Reactions: 


 





No Known Allergies Allergy (Verified 07/08/18 11:11)


 








Home Medications: 














 Medication  Instructions  Recorded


 


Permethrin 60 gm TP ONCE 1 Days  cream..g. 07/08/18


 


Protonix 40mg (*)  07/08/18














Medical Decision Making


ED Course/Re-evaluation: 





Patient seen and evaluated for scabies exposure, and new rash.





Impression/plan


Rash is nonspecific however with scabies exposure will treat with Elimite





Follow-up primary care


Differential Diagnosis: 





Differential diagnosis considered but not limited to:


Dermatitis, urticaria, toxicodendron exposure, scabies





Departure





- Departure


Disposition: Home, Routine, Self-Care


Clinical Impression: 


 Rash, Exposure to scabies





Condition: Good


Instructions:  Scabies (ED)


Referrals: 


NONE *PRIMARY CARE P,. [Primary Care Provider] - As per Instructions


Prescriptions: 


Permethrin 60 gm TP ONCE 1 Days  cream..g.

## 2018-09-15 ENCOUNTER — HOSPITAL ENCOUNTER (EMERGENCY)
Dept: HOSPITAL 80 - FED | Age: 43
Discharge: HOME | End: 2018-09-15
Payer: COMMERCIAL

## 2018-09-15 VITALS — SYSTOLIC BLOOD PRESSURE: 96 MMHG | DIASTOLIC BLOOD PRESSURE: 66 MMHG

## 2018-09-15 DIAGNOSIS — S16.1XXA: Primary | ICD-10-CM
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
## 2019-01-31 ENCOUNTER — HOSPITAL ENCOUNTER (EMERGENCY)
Dept: HOSPITAL 80 - FED | Age: 44
Discharge: HOME | End: 2019-01-31
Payer: COMMERCIAL

## 2019-01-31 VITALS — DIASTOLIC BLOOD PRESSURE: 67 MMHG | SYSTOLIC BLOOD PRESSURE: 122 MMHG

## 2019-01-31 DIAGNOSIS — K59.00: ICD-10-CM

## 2019-01-31 DIAGNOSIS — N83.291: ICD-10-CM

## 2019-01-31 DIAGNOSIS — N83.292: ICD-10-CM

## 2019-01-31 DIAGNOSIS — Z90.49: ICD-10-CM

## 2019-01-31 DIAGNOSIS — R06.00: Primary | ICD-10-CM

## 2019-01-31 DIAGNOSIS — F41.9: ICD-10-CM

## 2019-01-31 DIAGNOSIS — Z90.710: ICD-10-CM

## 2019-01-31 LAB — PLATELET # BLD: 358 10^3/UL (ref 150–400)

## 2019-01-31 NOTE — EDPHY
H & P


Stated Complaint: SOB


Time Seen by Provider: 01/31/19 19:12





- Personal History


LMP (Females 10-55): Hysterectomy


Current Tetanus/Diphtheria Vaccine: Yes


Tetanus Vaccine Date: 2010





- Medical/Surgical History


Hx Asthma: No


Hx Chronic Respiratory Disease: No


Hx Diabetes: No


Hx Cardiac Disease: No


Hx Renal Disease: No


Hx Cirrhosis: No


Hx Alcoholism: No


Hx HIV/AIDS: No


Hx Splenectomy or Spleen Trauma: No


Other PMH: left ovarian cyst, hysterectomy-prolapse.  laxative/ stool softener 

dependent, chronic constipation,.  abdominal distention 4-6 months. Bowel 

surgery April 2018.





- Social History


Smoking Status: Never smoked


Constitutional: 


 Initial Vital Signs











Temperature (C)  36.7 C   01/31/19 18:57


 


Heart Rate  118 H  01/31/19 18:57


 


Respiratory Rate  25 H  01/31/19 18:57


 


Blood Pressure  107/79   01/31/19 18:57


 


O2 Sat (%)  98   01/31/19 18:57








 











O2 Delivery Mode               Room Air














Allergies/Adverse Reactions: 


 





No Known Allergies Allergy (Verified 01/31/19 18:59)


 








Home Medications: 














 Medication  Instructions  Recorded


 


Protonix 40mg (*)  07/08/18














Medical Decision Making





- Diagnostics


Imaging Results: 


 Imaging Impressions





Chest X-Ray  01/31/19 19:00


Impression: Airways disease.


 








Abdomen CT  01/31/19 20:12


Impression: 1. Bilateral ovarian cysts dominant on the right. 


2. Constipation.


 


Results discussed with Dr. Palmer and 9:07 PM.


 


General information for patients regarding this examination can be found at 

[email or web address].


 


If you have questions or comments about this report, please contact me at [phone] (hospital) or [phone] (cell). 


 











Imaging: Discussed imaging studies w/ On call Radiologist, I viewed and 

interpreted images myself


ED Course/Re-evaluation: 





CHIEF COMPLAINT: Shortness of breath





HISTORY OF PRESENT ILLNESS:


The patient is a 42 y/o female with a history a colectomy and hysterectomy 

complaining of shortness of breath onset 2 days ago. She reports that "she 

needs to take another breath of air" and fees like "something doesn't let her 

breath in fully". Due to her irregular breathing she is feeling more anxious 

than normal. Due to her irregular breathing she is having mild chest tightness 

and lightheadedness. She tried using her daughters albuterol inhaler without 

relief of her symptoms. She denies a history of similar symptoms or respiratory 

illness. No fever, headache, chest pain, abdominal pain, urinary or bowel 

complaints, numbness, paresthesias.





REVIEW OF SYSTEMS:  





A comprehensive 10 system review of systems is otherwise negative aside from 

elements mentioned in the history of present illness and medical decision 

making.





PHYSICAL EXAM:  





HR, BP, O2 Sat, RR.  Temp noted


General Appearance:  Alert, well hydrated, appropriate, and non-toxic appearing.


Head:  Atraumatic without scalp tenderness or obvious injury


Eyes:  Pupils equal, round, reactive to light and accommodation, EOMI, no trauma

, no injection.


Ears:  Clear bilaterally, no perforation, normal landmarks


Nose:  Atraumatic, no rhinorrhea, clear.


Throat:  There is no erythema or exudates, no lesions, normal tonsils, mucus 

membranes moist.


Neck:  Supple, 2+ carotid upstroke, nontender, no lymphadenopathy.


Respiratory: Irregular breathing pattern.  No retractions, no distress, no 

wheezes, and no accessory muscle use.  Lungs are clear to auscultation 

bilaterally.


Cardiovascular:  Regular rate and rhythm, no murmurs, rubs, or gallops. 

Bilateral carotid, radial, dorsalis pedis, and posterior tibial pulses intact. 

Good capillary refill all extremities.


Gastrointestinal:  Abdomen is soft, nontender, non-distended, no masses, no 

rebound, no guarding, no peritoneal signs.


Musculoskeletal:  Normal active ROM of all extremities, atraumatic.


Neurological:  Alert, appropriate, and interactive.  The patient has normal 

DTRs and non-focal cranial nerves, motor, sensory, and cerebellar exam.


Skin:  No rashes, good turgor, no nodules on palpation.





Past medical history: Left ovarian cyst, chronic constipation


Past surgical history: Hysterectomy, colectomy, spinal stimulator


Family history: Denies


Social history: Lives in [place], single, student at 





DIAGNOSTICS/PROCEDURES/CRITICAL CARE TIME:  





Chest x-ray: No acute findings.





Abdominopelvic CT:  Bilateral ovarian cysts dominant on the right. Constipation.





DIFFERENTIAL DIAGNOSIS:   


The differential diagnosis for the patient's shortness of breath included but 

was not limited to anxiety, pneumonia, myocardial infarction, acute mountain 

sickness, high altitude pulmonary edema, congestive heart failure, and 

pulmonary embolus.





MEDICAL DECISION MAKING:  


The patient is a 42 y/o female with a history a colectomy and hysterectomy 

presenting with shortness of breath onset 2 days ago. On exam she has an 

irregular breathing pattern where she takes a short breath and does not inhale 

fully. Chest x-ray and labs ordered; 1mg IV Ativan and DuoNeb administered.





1930: I reviewed patient's chest x-ray which is normal.





2005: Reassessed patient and discussed laboratory and imaging findings. She 

reports that she typically has abdominal distension but is unable to know if 

her stimulator (which helps with peristalsis) is working. There is no evidence 

of air in the stomach per the chest x-ray. I have offered her the option to do 

more imaging, which she is comfortable with. Abdominopelvic CT ordered. Her 

friend at the bedside states that the patient is under more stress, so it would 

not be abnormal that this could be due to anxiety. 





2115: I spoke with Dr. Oppenheimer who reports that the patients abdominopelvic 

CT reveals bilateral ovarian cysts dominant on the right and constipation.





2120: Reassessed patient and discussed imaging findings. She is not hypoxemic. 

I have prescribed her Ativan for her symptoms. I have also discussed plan for 

pulmonology follow up. Return precautions provided; patient is comfortable with 

this plan.








- Data Points


Laboratory Results: 


 Laboratory Results





 01/31/19 19:25 





 01/31/19 19:25 





 











  01/31/19 01/31/19 01/31/19





  19:25 19:25 19:25


 


WBC      9.22 10^3/uL 10^3/uL





     (3.80-9.50) 


 


RBC      4.65 10^6/uL 10^6/uL





     (4.18-5.33) 


 


Hgb      13.7 g/dL g/dL





     (12.6-16.3) 


 


Hct      39.7 % %





     (38.0-47.0) 


 


MCV      85.4 fL fL





     (81.5-99.8) 


 


MCH      29.5 pg pg





     (27.9-34.1) 


 


MCHC      34.5 g/dL g/dL





     (32.4-36.7) 


 


RDW      13.1 % %





     (11.5-15.2) 


 


Plt Count      358 10^3/uL 10^3/uL





     (150-400) 


 


MPV      8.8 fL fL





     (8.7-11.7) 


 


Neut % (Auto)      44.9 % %





     (39.3-74.2) 


 


Lymph % (Auto)      44.8 % %





     (15.0-45.0) 


 


Mono % (Auto)      8.9 % %





     (4.5-13.0) 


 


Eos % (Auto)      0.8 % %





     (0.6-7.6) 


 


Baso % (Auto)      0.4 % %





     (0.3-1.7) 


 


Nucleat RBC Rel Count      0.0 % %





     (0.0-0.2) 


 


Absolute Neuts (auto)      4.14 10^3/uL 10^3/uL





     (1.70-6.50) 


 


Absolute Lymphs (auto)      4.13 10^3/uL H 10^3/uL





     (1.00-3.00) 


 


Absolute Monos (auto)      0.82 10^3/uL H 10^3/uL





     (0.30-0.80) 


 


Absolute Eos (auto)      0.07 10^3/uL 10^3/uL





     (0.03-0.40) 


 


Absolute Basos (auto)      0.04 10^3/uL 10^3/uL





     (0.02-0.10) 


 


Absolute Nucleated RBC      0.00 10^3/uL 10^3/uL





     (0-0.01) 


 


Immature Gran %      0.2 % %





     (0.0-1.1) 


 


Immature Gran #      0.02 10^3/uL 10^3/uL





     (0.00-0.10) 


 


D-Dimer  < 0.27 ug/mLFEU ug/mLFEU    





   (0.00-0.50)   


 


Sodium    133 mEq/L L mEq/L  





    (135-145)  


 


Potassium    4.4 mEq/L mEq/L  





    (3.5-5.2)  


 


Chloride    105 mEq/L mEq/L  





    ()  


 


Carbon Dioxide    16 mEq/l L mEq/l  





    (22-31)  


 


Anion Gap    12 mEq/L mEq/L  





    (6-14)  


 


BUN    12 mg/dL mg/dL  





    (7-23)  


 


Creatinine    0.6 mg/dL mg/dL  





    (0.6-1.0)  


 


Estimated GFR    > 60   





    


 


Glucose    100 mg/dL mg/dL  





    ()  


 


Calcium    9.9 mg/dL mg/dL  





    (8.5-10.4)  











Medications Given: 


 








Discontinued Medications





Albuterol/Ipratropium (Duoneb)  3 ml IH EDNOW ONE


   Stop: 01/31/19 19:17


   Last Admin: 01/31/19 19:24 Dose:  3 ml


Lorazepam (Ativan)  0.5 mg PO EDNOW ONE


   Stop: 01/31/19 19:19


   Last Admin: 01/31/19 19:27 Dose:  Not Given


Lorazepam (Ativan Injection)  0.5 mg IVP EDNOW ONE


   Stop: 01/31/19 19:28


   Last Admin: 01/31/19 19:28 Dose:  0.5 mg








Departure





- Departure


Disposition: Home, Routine, Self-Care


Clinical Impression: 


 Anxiety





Dyspnea


Qualifiers:


 Dyspnea type: unspecified Qualified Code(s): R06.00 - Dyspnea, unspecified





Condition: Good


Instructions:  Dyspnea (ED), Anxiety (ED)


Additional Instructions: 


1. Follow-up with a pulmonologist within the next week.


2. Follow-up with your primary doctor within 72 hours.  


3. Return to the Emergency Department for fever, chest pain, shortness of breath

, increasing pain or other worsening of condition.


4. Take Ativan as prescribed.





Referrals: 


Christophe Quiles MD [Medical Doctor] - As per Instructions


Victorina Khan PA [Primary Care Provider] - As per Instructions


[facility],. [Clinic] - As per Instructions

## 2019-06-08 ENCOUNTER — HOSPITAL ENCOUNTER (EMERGENCY)
Dept: HOSPITAL 80 - FED | Age: 44
Discharge: HOME | End: 2019-06-08
Payer: MEDICAID